# Patient Record
Sex: FEMALE | Race: WHITE | NOT HISPANIC OR LATINO | Employment: OTHER | URBAN - METROPOLITAN AREA
[De-identification: names, ages, dates, MRNs, and addresses within clinical notes are randomized per-mention and may not be internally consistent; named-entity substitution may affect disease eponyms.]

---

## 2017-03-15 PROBLEM — Z86.010 HISTORY OF COLON POLYPS: Status: ACTIVE | Noted: 2017-03-15

## 2017-08-03 PROBLEM — D49.2 NEOPLASM OF UNSPECIFIED BEHAVIOR OF BONE, SOFT TISSUE, AND SKIN: Status: RESOLVED | Noted: 2017-07-20 | Resolved: 2017-08-03

## 2017-10-11 ENCOUNTER — PATIENT OUTREACH (OUTPATIENT)
Dept: HEALTH INFORMATION MANAGEMENT | Facility: OTHER | Age: 67
End: 2017-10-11

## 2017-10-11 NOTE — PROGRESS NOTES
Attempt #:1    Verify PCP: yes    Communication Preference Obtained: yes     Review Care Team: yes    Annual Wellness Visit Scheduling  1. Scheduling Status:Scheduled            Scheduled patient for Annual Wellness Visit       MyChart Activation: declined  [employer] [place]: no  Virtual Visits: no  Opt In to Text Messages: no

## 2018-03-07 PROBLEM — M75.100 ROTATOR CUFF TEAR: Status: ACTIVE | Noted: 2018-03-07

## 2019-10-29 ENCOUNTER — APPOINTMENT (RX ONLY)
Dept: URBAN - METROPOLITAN AREA CLINIC 31 | Facility: CLINIC | Age: 69
Setting detail: DERMATOLOGY
End: 2019-10-29

## 2019-10-29 DIAGNOSIS — D22 MELANOCYTIC NEVI: ICD-10-CM

## 2019-10-29 DIAGNOSIS — Z71.89 OTHER SPECIFIED COUNSELING: ICD-10-CM

## 2019-10-29 DIAGNOSIS — L81.4 OTHER MELANIN HYPERPIGMENTATION: ICD-10-CM

## 2019-10-29 DIAGNOSIS — L63.8 OTHER ALOPECIA AREATA: ICD-10-CM

## 2019-10-29 DIAGNOSIS — D18.0 HEMANGIOMA: ICD-10-CM

## 2019-10-29 PROBLEM — E03.9 HYPOTHYROIDISM, UNSPECIFIED: Status: ACTIVE | Noted: 2019-10-29

## 2019-10-29 PROBLEM — D18.01 HEMANGIOMA OF SKIN AND SUBCUTANEOUS TISSUE: Status: ACTIVE | Noted: 2019-10-29

## 2019-10-29 PROBLEM — D48.5 NEOPLASM OF UNCERTAIN BEHAVIOR OF SKIN: Status: ACTIVE | Noted: 2019-10-29

## 2019-10-29 PROBLEM — D22.5 MELANOCYTIC NEVI OF TRUNK: Status: ACTIVE | Noted: 2019-10-29

## 2019-10-29 PROCEDURE — ? BIOPSY BY SHAVE METHOD

## 2019-10-29 PROCEDURE — 11102 TANGNTL BX SKIN SINGLE LES: CPT

## 2019-10-29 PROCEDURE — 99213 OFFICE O/P EST LOW 20 MIN: CPT | Mod: 25

## 2019-10-29 PROCEDURE — ? COUNSELING

## 2019-10-29 ASSESSMENT — LOCATION ZONE DERM
LOCATION ZONE: TRUNK
LOCATION ZONE: SCALP

## 2019-10-29 ASSESSMENT — SEVERITY ASSESSMENT OVERALL AMONG ALL PATIENTS
IN YOUR EXPERIENCE, AMONG ALL PATIENTS YOU HAVE SEEN WITH THIS CONDITION, HOW SEVERE IS THIS PATIENT'S CONDITION?: S1 (LESS THAN 25% HAIR LOSS)

## 2019-10-29 ASSESSMENT — LOCATION DETAILED DESCRIPTION DERM
LOCATION DETAILED: EPIGASTRIC SKIN
LOCATION DETAILED: LEFT RIB CAGE
LOCATION DETAILED: RIGHT MEDIAL FRONTAL SCALP

## 2019-10-29 ASSESSMENT — LOCATION SIMPLE DESCRIPTION DERM
LOCATION SIMPLE: ABDOMEN
LOCATION SIMPLE: RIGHT SCALP

## 2019-10-29 NOTE — PROCEDURE: BIOPSY BY SHAVE METHOD
Biopsy Type: H and E
Electrodesiccation Text: The wound bed was treated with electrodesiccation after the biopsy was performed.
Additional Anesthesia Volume In Cc (Will Not Render If 0): 0
Anesthesia Type: 1% lidocaine with epinephrine
Bill For Surgical Tray: no
Wound Care: Vaseline
Lab Facility: 
Notification Instructions: Patient will be notified of biopsy results. However, patient instructed to call the office if not contacted within 2 weeks.
Anesthesia Volume In Cc: 0.5
Was A Bandage Applied: Yes
Consent: Written consent was obtained and risks were reviewed including but not limited to scarring, infection, bleeding, scabbing, incomplete removal, nerve damage and allergy to anesthesia.
Dressing: bandage
Electrodesiccation And Curettage Text: The wound bed was treated with electrodesiccation and curettage after the biopsy was performed.
Type Of Destruction Used: Curettage
Hemostasis: Drysol and Electrocautery
Silver Nitrate Text: The wound bed was treated with silver nitrate after the biopsy was performed.
Curettage Text: The wound bed was treated with curettage after the biopsy was performed.
Detail Level: Detailed
Biopsy Method: Personna blade
Depth Of Biopsy: dermis
Cryotherapy Text: The wound bed was treated with cryotherapy after the biopsy was performed.
Billing Type: Third-Party Bill
Post-Care Instructions: I reviewed with the patient in detail post-care instructions. Patient is to keep the biopsy site dry overnight, and then apply vaseline twice daily until healed.
Lab: [id number]

## 2019-10-29 NOTE — HPI: FULL BODY SKIN EXAMINATION
What Is The Reason For Today's Visit?: Full Body Skin Examination
What Is The Reason For Today's Visit? (Being Monitored For X): concerning skin lesions on an annual basis
Additional History: Patient denies any changing moles or tender or bleeding spots

## 2021-05-17 ENCOUNTER — APPOINTMENT (RX ONLY)
Dept: URBAN - METROPOLITAN AREA CLINIC 31 | Facility: CLINIC | Age: 71
Setting detail: DERMATOLOGY
End: 2021-05-17

## 2023-04-03 PROBLEM — K21.9 GASTROESOPHAGEAL REFLUX DISEASE WITHOUT ESOPHAGITIS: Status: ACTIVE | Noted: 2021-04-20

## 2023-04-03 PROBLEM — E78.2 MIXED HYPERLIPIDEMIA: Status: ACTIVE | Noted: 2021-04-20

## 2023-04-06 PROBLEM — R60.0 EXTREMITY EDEMA: Status: ACTIVE | Noted: 2022-04-21

## 2023-04-06 PROBLEM — N95.1 POSTMENOPAUSAL DISORDER: Status: ACTIVE | Noted: 2021-04-20

## 2023-04-06 PROBLEM — R94.31 ABNORMAL ECG: Status: ACTIVE | Noted: 2023-01-17

## 2023-04-06 PROBLEM — I65.22 LEFT CAROTID ARTERY OCCLUSION: Status: ACTIVE | Noted: 2022-03-15

## 2023-04-06 PROBLEM — R22.1 LOCALIZED SWELLING, MASS AND LUMP, NECK: Status: ACTIVE | Noted: 2021-10-11

## 2023-04-06 PROBLEM — E66.9 OBESITY (BMI 35.0-39.9 WITHOUT COMORBIDITY): Status: ACTIVE | Noted: 2021-04-20

## 2023-04-06 PROBLEM — D33.3 SCHWANNOMA OF CRANIAL NERVE (HCC): Status: ACTIVE | Noted: 2021-07-14

## 2023-04-06 PROBLEM — D35.5: Status: ACTIVE | Noted: 2023-04-06

## 2023-04-06 PROBLEM — I65.09 VERTEBRAL ARTERY OBSTRUCTION: Status: ACTIVE | Noted: 2022-03-15

## 2023-04-14 ENCOUNTER — HOSPITAL ENCOUNTER (INPATIENT)
Facility: MEDICAL CENTER | Age: 73
LOS: 6 days | DRG: 853 | End: 2023-04-20
Attending: INTERNAL MEDICINE | Admitting: STUDENT IN AN ORGANIZED HEALTH CARE EDUCATION/TRAINING PROGRAM
Payer: MEDICARE

## 2023-04-14 DIAGNOSIS — R06.09 CHRONIC DYSPNEA: ICD-10-CM

## 2023-04-14 PROBLEM — A41.9 SEVERE SEPSIS (HCC): Status: ACTIVE | Noted: 2023-04-14

## 2023-04-14 PROBLEM — K85.10 GALL STONE PANCREATITIS: Status: ACTIVE | Noted: 2023-04-14

## 2023-04-14 PROBLEM — F10.20 ALCOHOL DEPENDENCE (HCC): Status: ACTIVE | Noted: 2023-04-14

## 2023-04-14 PROBLEM — R73.9 HYPERGLYCEMIA: Status: ACTIVE | Noted: 2023-04-14

## 2023-04-14 PROBLEM — R65.20 SEVERE SEPSIS (HCC): Status: ACTIVE | Noted: 2023-04-14

## 2023-04-14 PROBLEM — Z71.89 ACP (ADVANCE CARE PLANNING): Status: ACTIVE | Noted: 2023-04-14

## 2023-04-14 PROBLEM — E87.6 HYPOKALEMIA: Status: ACTIVE | Noted: 2023-04-14

## 2023-04-14 PROBLEM — K81.9 CHOLECYSTITIS: Status: ACTIVE | Noted: 2023-04-14

## 2023-04-14 PROBLEM — E87.20 LACTIC ACIDOSIS: Status: ACTIVE | Noted: 2023-04-14

## 2023-04-14 PROBLEM — E83.42 HYPOMAGNESEMIA: Status: ACTIVE | Noted: 2023-04-14

## 2023-04-14 PROBLEM — K80.42 CHOLEDOCHOLITHIASIS WITH ACUTE CHOLECYSTITIS: Status: ACTIVE | Noted: 2023-04-14

## 2023-04-14 LAB
CK SERPL-CCNC: 362 U/L (ref 0–154)
CRP SERPL HS-MCNC: 2.55 MG/DL (ref 0–0.75)
ERYTHROCYTE [SEDIMENTATION RATE] IN BLOOD BY WESTERGREN METHOD: 3 MM/HOUR (ref 0–25)
ETHANOL BLD-MCNC: <10.1 MG/DL
LACTATE SERPL-SCNC: 3 MMOL/L (ref 0.5–2)
LDH SERPL L TO P-CCNC: 283 U/L (ref 107–266)
PROCALCITONIN SERPL-MCNC: 22.4 NG/ML
TROPONIN T SERPL-MCNC: 21 NG/L (ref 6–19)

## 2023-04-14 PROCEDURE — 700102 HCHG RX REV CODE 250 W/ 637 OVERRIDE(OP): Performed by: STUDENT IN AN ORGANIZED HEALTH CARE EDUCATION/TRAINING PROGRAM

## 2023-04-14 PROCEDURE — 83605 ASSAY OF LACTIC ACID: CPT | Mod: 91

## 2023-04-14 PROCEDURE — 700105 HCHG RX REV CODE 258: Performed by: STUDENT IN AN ORGANIZED HEALTH CARE EDUCATION/TRAINING PROGRAM

## 2023-04-14 PROCEDURE — 99291 CRITICAL CARE FIRST HOUR: CPT | Performed by: STUDENT IN AN ORGANIZED HEALTH CARE EDUCATION/TRAINING PROGRAM

## 2023-04-14 PROCEDURE — 700101 HCHG RX REV CODE 250: Performed by: STUDENT IN AN ORGANIZED HEALTH CARE EDUCATION/TRAINING PROGRAM

## 2023-04-14 PROCEDURE — A9270 NON-COVERED ITEM OR SERVICE: HCPCS | Performed by: STUDENT IN AN ORGANIZED HEALTH CARE EDUCATION/TRAINING PROGRAM

## 2023-04-14 PROCEDURE — 85652 RBC SED RATE AUTOMATED: CPT

## 2023-04-14 PROCEDURE — 82550 ASSAY OF CK (CPK): CPT

## 2023-04-14 PROCEDURE — 86140 C-REACTIVE PROTEIN: CPT

## 2023-04-14 PROCEDURE — C9113 INJ PANTOPRAZOLE SODIUM, VIA: HCPCS | Performed by: STUDENT IN AN ORGANIZED HEALTH CARE EDUCATION/TRAINING PROGRAM

## 2023-04-14 PROCEDURE — 93005 ELECTROCARDIOGRAM TRACING: CPT | Performed by: STUDENT IN AN ORGANIZED HEALTH CARE EDUCATION/TRAINING PROGRAM

## 2023-04-14 PROCEDURE — 82077 ASSAY SPEC XCP UR&BREATH IA: CPT

## 2023-04-14 PROCEDURE — 770000 HCHG ROOM/CARE - INTERMEDIATE ICU *

## 2023-04-14 PROCEDURE — 700111 HCHG RX REV CODE 636 W/ 250 OVERRIDE (IP): Performed by: STUDENT IN AN ORGANIZED HEALTH CARE EDUCATION/TRAINING PROGRAM

## 2023-04-14 PROCEDURE — 83615 LACTATE (LD) (LDH) ENZYME: CPT

## 2023-04-14 PROCEDURE — 84484 ASSAY OF TROPONIN QUANT: CPT | Mod: 91

## 2023-04-14 PROCEDURE — HZ2ZZZZ DETOXIFICATION SERVICES FOR SUBSTANCE ABUSE TREATMENT: ICD-10-PCS | Performed by: STUDENT IN AN ORGANIZED HEALTH CARE EDUCATION/TRAINING PROGRAM

## 2023-04-14 PROCEDURE — 84145 PROCALCITONIN (PCT): CPT

## 2023-04-14 RX ORDER — LABETALOL HYDROCHLORIDE 5 MG/ML
10 INJECTION, SOLUTION INTRAVENOUS EVERY 4 HOURS PRN
Status: DISCONTINUED | OUTPATIENT
Start: 2023-04-14 | End: 2023-04-20 | Stop reason: HOSPADM

## 2023-04-14 RX ORDER — AMOXICILLIN 250 MG
2 CAPSULE ORAL 2 TIMES DAILY
Status: DISCONTINUED | OUTPATIENT
Start: 2023-04-14 | End: 2023-04-15

## 2023-04-14 RX ORDER — POLYETHYLENE GLYCOL 3350 17 G/17G
1 POWDER, FOR SOLUTION ORAL
Status: DISCONTINUED | OUTPATIENT
Start: 2023-04-14 | End: 2023-04-15

## 2023-04-14 RX ORDER — LORAZEPAM 2 MG/ML
1 INJECTION INTRAMUSCULAR
Status: DISCONTINUED | OUTPATIENT
Start: 2023-04-14 | End: 2023-04-16

## 2023-04-14 RX ORDER — DIAZEPAM 5 MG/1
2.5 TABLET ORAL EVERY 6 HOURS PRN
Status: DISCONTINUED | OUTPATIENT
Start: 2023-04-14 | End: 2023-04-15

## 2023-04-14 RX ORDER — ATORVASTATIN CALCIUM 20 MG/1
20 TABLET, FILM COATED ORAL EVERY EVENING
Status: DISCONTINUED | OUTPATIENT
Start: 2023-04-14 | End: 2023-04-16

## 2023-04-14 RX ORDER — GAUZE BANDAGE 2" X 2"
100 BANDAGE TOPICAL DAILY
Status: COMPLETED | OUTPATIENT
Start: 2023-04-16 | End: 2023-04-19

## 2023-04-14 RX ORDER — FOLIC ACID 1 MG/1
1 TABLET ORAL DAILY
Status: COMPLETED | OUTPATIENT
Start: 2023-04-15 | End: 2023-04-18

## 2023-04-14 RX ORDER — LORAZEPAM 2 MG/ML
2 INJECTION INTRAMUSCULAR
Status: DISCONTINUED | OUTPATIENT
Start: 2023-04-14 | End: 2023-04-16

## 2023-04-14 RX ORDER — LORAZEPAM 2 MG/ML
0.5 INJECTION INTRAMUSCULAR EVERY 4 HOURS PRN
Status: DISCONTINUED | OUTPATIENT
Start: 2023-04-14 | End: 2023-04-16

## 2023-04-14 RX ORDER — OXYCODONE HYDROCHLORIDE 5 MG/1
5 TABLET ORAL
Status: DISCONTINUED | OUTPATIENT
Start: 2023-04-14 | End: 2023-04-20 | Stop reason: HOSPADM

## 2023-04-14 RX ORDER — LORAZEPAM 2 MG/ML
1.5 INJECTION INTRAMUSCULAR
Status: DISCONTINUED | OUTPATIENT
Start: 2023-04-14 | End: 2023-04-16

## 2023-04-14 RX ORDER — ONDANSETRON 2 MG/ML
4 INJECTION INTRAMUSCULAR; INTRAVENOUS EVERY 4 HOURS PRN
Status: DISCONTINUED | OUTPATIENT
Start: 2023-04-14 | End: 2023-04-20 | Stop reason: HOSPADM

## 2023-04-14 RX ORDER — LORAZEPAM 1 MG/1
2 TABLET ORAL
Status: DISCONTINUED | OUTPATIENT
Start: 2023-04-14 | End: 2023-04-16

## 2023-04-14 RX ORDER — GUAIFENESIN/DEXTROMETHORPHAN 100-10MG/5
10 SYRUP ORAL EVERY 6 HOURS PRN
Status: DISCONTINUED | OUTPATIENT
Start: 2023-04-14 | End: 2023-04-20 | Stop reason: HOSPADM

## 2023-04-14 RX ORDER — ONDANSETRON 4 MG/1
4 TABLET, ORALLY DISINTEGRATING ORAL EVERY 4 HOURS PRN
Status: DISCONTINUED | OUTPATIENT
Start: 2023-04-14 | End: 2023-04-20 | Stop reason: HOSPADM

## 2023-04-14 RX ORDER — HYDROMORPHONE HYDROCHLORIDE 1 MG/ML
0.5 INJECTION, SOLUTION INTRAMUSCULAR; INTRAVENOUS; SUBCUTANEOUS
Status: DISCONTINUED | OUTPATIENT
Start: 2023-04-14 | End: 2023-04-20 | Stop reason: HOSPADM

## 2023-04-14 RX ORDER — LEVOTHYROXINE SODIUM 112 UG/1
112 TABLET ORAL
Status: DISCONTINUED | OUTPATIENT
Start: 2023-04-15 | End: 2023-04-20 | Stop reason: HOSPADM

## 2023-04-14 RX ORDER — LORAZEPAM 1 MG/1
1 TABLET ORAL EVERY 4 HOURS PRN
Status: DISCONTINUED | OUTPATIENT
Start: 2023-04-14 | End: 2023-04-16

## 2023-04-14 RX ORDER — LORAZEPAM 1 MG/1
0.5 TABLET ORAL EVERY 4 HOURS PRN
Status: DISCONTINUED | OUTPATIENT
Start: 2023-04-14 | End: 2023-04-16

## 2023-04-14 RX ORDER — LORAZEPAM 1 MG/1
4 TABLET ORAL
Status: DISCONTINUED | OUTPATIENT
Start: 2023-04-14 | End: 2023-04-16

## 2023-04-14 RX ORDER — SODIUM CHLORIDE AND POTASSIUM CHLORIDE 300; 900 MG/100ML; MG/100ML
3000 INJECTION, SOLUTION INTRAVENOUS CONTINUOUS
Status: DISCONTINUED | OUTPATIENT
Start: 2023-04-14 | End: 2023-04-14

## 2023-04-14 RX ORDER — OXYCODONE HYDROCHLORIDE 10 MG/1
10 TABLET ORAL
Status: DISCONTINUED | OUTPATIENT
Start: 2023-04-14 | End: 2023-04-20 | Stop reason: HOSPADM

## 2023-04-14 RX ORDER — LORAZEPAM 1 MG/1
3 TABLET ORAL
Status: DISCONTINUED | OUTPATIENT
Start: 2023-04-14 | End: 2023-04-16

## 2023-04-14 RX ORDER — PANTOPRAZOLE SODIUM 40 MG/10ML
40 INJECTION, POWDER, LYOPHILIZED, FOR SOLUTION INTRAVENOUS 2 TIMES DAILY
Status: COMPLETED | OUTPATIENT
Start: 2023-04-14 | End: 2023-04-17

## 2023-04-14 RX ORDER — ACETAMINOPHEN 325 MG/1
650 TABLET ORAL EVERY 6 HOURS PRN
Status: DISCONTINUED | OUTPATIENT
Start: 2023-04-14 | End: 2023-04-20 | Stop reason: HOSPADM

## 2023-04-14 RX ORDER — SODIUM CHLORIDE, SODIUM LACTATE, POTASSIUM CHLORIDE, AND CALCIUM CHLORIDE .6; .31; .03; .02 G/100ML; G/100ML; G/100ML; G/100ML
500 INJECTION, SOLUTION INTRAVENOUS
Status: DISCONTINUED | OUTPATIENT
Start: 2023-04-14 | End: 2023-04-20 | Stop reason: HOSPADM

## 2023-04-14 RX ORDER — SODIUM CHLORIDE AND POTASSIUM CHLORIDE 300; 900 MG/100ML; MG/100ML
3000 INJECTION, SOLUTION INTRAVENOUS CONTINUOUS
Status: DISCONTINUED | OUTPATIENT
Start: 2023-04-14 | End: 2023-04-15

## 2023-04-14 RX ORDER — LOSARTAN POTASSIUM 50 MG/1
50 TABLET ORAL 2 TIMES DAILY
Status: DISCONTINUED | OUTPATIENT
Start: 2023-04-14 | End: 2023-04-20 | Stop reason: HOSPADM

## 2023-04-14 RX ORDER — BISACODYL 10 MG
10 SUPPOSITORY, RECTAL RECTAL
Status: DISCONTINUED | OUTPATIENT
Start: 2023-04-14 | End: 2023-04-15

## 2023-04-14 RX ADMIN — LOSARTAN POTASSIUM 50 MG: 50 TABLET, FILM COATED ORAL at 22:36

## 2023-04-14 RX ADMIN — MAGNESIUM SULFATE 3 G: 500 INJECTION, SOLUTION INTRAMUSCULAR; INTRAVENOUS at 21:44

## 2023-04-14 RX ADMIN — PIPERACILLIN AND TAZOBACTAM 3.38 G: 3; .375 INJECTION, POWDER, LYOPHILIZED, FOR SOLUTION INTRAVENOUS; PARENTERAL at 20:17

## 2023-04-14 RX ADMIN — POTASSIUM CHLORIDE AND SODIUM CHLORIDE 1000 ML: 900; 300 INJECTION, SOLUTION INTRAVENOUS at 22:33

## 2023-04-14 RX ADMIN — PIPERACILLIN AND TAZOBACTAM 3.38 G: 3; .375 INJECTION, POWDER, LYOPHILIZED, FOR SOLUTION INTRAVENOUS; PARENTERAL at 23:46

## 2023-04-14 RX ADMIN — PANTOPRAZOLE SODIUM 40 MG: 40 INJECTION, POWDER, FOR SOLUTION INTRAVENOUS at 21:40

## 2023-04-14 RX ADMIN — THIAMINE HYDROCHLORIDE 500 MG: 100 INJECTION, SOLUTION INTRAMUSCULAR; INTRAVENOUS at 23:02

## 2023-04-14 RX ADMIN — ATORVASTATIN CALCIUM 20 MG: 20 TABLET, FILM COATED ORAL at 22:36

## 2023-04-14 ASSESSMENT — COGNITIVE AND FUNCTIONAL STATUS - GENERAL
TOILETING: A LITTLE
DAILY ACTIVITIY SCORE: 20
CLIMB 3 TO 5 STEPS WITH RAILING: A LOT
STANDING UP FROM CHAIR USING ARMS: A LOT
WALKING IN HOSPITAL ROOM: A LOT
MOBILITY SCORE: 15
DRESSING REGULAR LOWER BODY CLOTHING: A LITTLE
HELP NEEDED FOR BATHING: A LITTLE
MOVING FROM LYING ON BACK TO SITTING ON SIDE OF FLAT BED: A LITTLE
SUGGESTED CMS G CODE MODIFIER DAILY ACTIVITY: CJ
MOVING TO AND FROM BED TO CHAIR: A LITTLE
DRESSING REGULAR UPPER BODY CLOTHING: A LITTLE
SUGGESTED CMS G CODE MODIFIER MOBILITY: CK
TURNING FROM BACK TO SIDE WHILE IN FLAT BAD: A LITTLE

## 2023-04-14 ASSESSMENT — LIFESTYLE VARIABLES
ORIENTATION AND CLOUDING OF SENSORIUM: ORIENTED AND CAN DO SERIAL ADDITIONS
HOW MANY TIMES IN THE PAST YEAR HAVE YOU HAD 5 OR MORE DRINKS IN A DAY: 0
PAROXYSMAL SWEATS: NO SWEAT VISIBLE
EVER FELT BAD OR GUILTY ABOUT YOUR DRINKING: NO
HAVE YOU EVER FELT YOU SHOULD CUT DOWN ON YOUR DRINKING: NO
ANXIETY: MILDLY ANXIOUS
ALCOHOL_USE: NO
AUDITORY DISTURBANCES: NOT PRESENT
CONSUMPTION TOTAL: NEGATIVE
VISUAL DISTURBANCES: NOT PRESENT
TOTAL SCORE: 0
HAVE PEOPLE ANNOYED YOU BY CRITICIZING YOUR DRINKING: NO
DOES PATIENT WANT TO STOP DRINKING: NO
ON A TYPICAL DAY WHEN YOU DRINK ALCOHOL HOW MANY DRINKS DO YOU HAVE: 1
TOTAL SCORE: 1
HEADACHE, FULLNESS IN HEAD: NOT PRESENT
EVER HAD A DRINK FIRST THING IN THE MORNING TO STEADY YOUR NERVES TO GET RID OF A HANGOVER: NO
TOTAL SCORE: 0
AVERAGE NUMBER OF DAYS PER WEEK YOU HAVE A DRINK CONTAINING ALCOHOL: 0
TREMOR: NO TREMOR
NAUSEA AND VOMITING: NO NAUSEA AND NO VOMITING
AGITATION: NORMAL ACTIVITY
TOTAL SCORE: 0

## 2023-04-14 ASSESSMENT — PATIENT HEALTH QUESTIONNAIRE - PHQ9
1. LITTLE INTEREST OR PLEASURE IN DOING THINGS: NOT AT ALL
SUM OF ALL RESPONSES TO PHQ9 QUESTIONS 1 AND 2: 0
2. FEELING DOWN, DEPRESSED, IRRITABLE, OR HOPELESS: NOT AT ALL

## 2023-04-14 ASSESSMENT — FIBROSIS 4 INDEX
FIB4 SCORE: 4.34

## 2023-04-14 NOTE — PROGRESS NOTES
72 year old with gallstone pancreatitis; lactic acid 5 but hemodynamically stable with normal mental status. She is ok for monitoring in the IMCU. Hospitalist to manage, critical care available upon request.    Evan Dsouza M.D.

## 2023-04-15 ENCOUNTER — APPOINTMENT (OUTPATIENT)
Dept: CARDIOLOGY | Facility: MEDICAL CENTER | Age: 73
DRG: 853 | End: 2023-04-15
Attending: STUDENT IN AN ORGANIZED HEALTH CARE EDUCATION/TRAINING PROGRAM
Payer: MEDICARE

## 2023-04-15 LAB
ALBUMIN SERPL BCP-MCNC: 3.6 G/DL (ref 3.2–4.9)
ALBUMIN/GLOB SERPL: 1.7 G/DL
ALP SERPL-CCNC: 61 U/L (ref 30–99)
ALT SERPL-CCNC: 69 U/L (ref 2–50)
ANION GAP SERPL CALC-SCNC: 10 MMOL/L (ref 7–16)
AST SERPL-CCNC: 93 U/L (ref 12–45)
BASOPHILS # BLD AUTO: 0.3 % (ref 0–1.8)
BASOPHILS # BLD: 0.04 K/UL (ref 0–0.12)
BILIRUB CONJ SERPL-MCNC: 0.3 MG/DL (ref 0.1–0.5)
BILIRUB INDIRECT SERPL-MCNC: 0.4 MG/DL (ref 0–1)
BILIRUB SERPL-MCNC: 0.9 MG/DL (ref 0.1–1.5)
BUN SERPL-MCNC: 11 MG/DL (ref 8–22)
C DIFF DNA SPEC QL NAA+PROBE: NEGATIVE
C DIFF TOX GENS STL QL NAA+PROBE: NEGATIVE
CALCIUM ALBUM COR SERPL-MCNC: 7.7 MG/DL (ref 8.5–10.5)
CALCIUM SERPL-MCNC: 7.4 MG/DL (ref 8.5–10.5)
CHLORIDE SERPL-SCNC: 106 MMOL/L (ref 96–112)
CO2 SERPL-SCNC: 21 MMOL/L (ref 20–33)
CREAT SERPL-MCNC: 1.01 MG/DL (ref 0.5–1.4)
EKG IMPRESSION: NORMAL
EKG IMPRESSION: NORMAL
EOSINOPHIL # BLD AUTO: 0.03 K/UL (ref 0–0.51)
EOSINOPHIL NFR BLD: 0.2 % (ref 0–6.9)
ERYTHROCYTE [DISTWIDTH] IN BLOOD BY AUTOMATED COUNT: 42.5 FL (ref 35.9–50)
EST. AVERAGE GLUCOSE BLD GHB EST-MCNC: 123 MG/DL
GFR SERPLBLD CREATININE-BSD FMLA CKD-EPI: 58 ML/MIN/1.73 M 2
GLOBULIN SER CALC-MCNC: 2.1 G/DL (ref 1.9–3.5)
GLUCOSE BLD STRIP.AUTO-MCNC: 117 MG/DL (ref 65–99)
GLUCOSE BLD STRIP.AUTO-MCNC: 79 MG/DL (ref 65–99)
GLUCOSE BLD STRIP.AUTO-MCNC: 88 MG/DL (ref 65–99)
GLUCOSE SERPL-MCNC: 130 MG/DL (ref 65–99)
HBA1C MFR BLD: 5.9 % (ref 4–5.6)
HCT VFR BLD AUTO: 38.6 % (ref 37–47)
HGB BLD-MCNC: 12.8 G/DL (ref 12–16)
IMM GRANULOCYTES # BLD AUTO: 0.12 K/UL (ref 0–0.11)
IMM GRANULOCYTES NFR BLD AUTO: 0.8 % (ref 0–0.9)
LACTATE SERPL-SCNC: 1.7 MMOL/L (ref 0.5–2)
LACTATE SERPL-SCNC: 2.1 MMOL/L (ref 0.5–2)
LACTATE SERPL-SCNC: 2.9 MMOL/L (ref 0.5–2)
LIPASE SERPL-CCNC: 652 U/L (ref 11–82)
LYMPHOCYTES # BLD AUTO: 0.61 K/UL (ref 1–4.8)
LYMPHOCYTES NFR BLD: 4 % (ref 22–41)
MAGNESIUM SERPL-MCNC: 2.4 MG/DL (ref 1.5–2.5)
MCH RBC QN AUTO: 29.4 PG (ref 27–33)
MCHC RBC AUTO-ENTMCNC: 33.2 G/DL (ref 33.6–35)
MCV RBC AUTO: 88.5 FL (ref 81.4–97.8)
MONOCYTES # BLD AUTO: 0.82 K/UL (ref 0–0.85)
MONOCYTES NFR BLD AUTO: 5.4 % (ref 0–13.4)
NEUTROPHILS # BLD AUTO: 13.49 K/UL (ref 2–7.15)
NEUTROPHILS NFR BLD: 89.3 % (ref 44–72)
NRBC # BLD AUTO: 0 K/UL
NRBC BLD-RTO: 0 /100 WBC
PHOSPHATE SERPL-MCNC: 2.6 MG/DL (ref 2.5–4.5)
PLATELET # BLD AUTO: 149 K/UL (ref 164–446)
PMV BLD AUTO: 10.1 FL (ref 9–12.9)
POTASSIUM SERPL-SCNC: 5.1 MMOL/L (ref 3.6–5.5)
PROT SERPL-MCNC: 5.7 G/DL (ref 6–8.2)
RBC # BLD AUTO: 4.36 M/UL (ref 4.2–5.4)
SODIUM SERPL-SCNC: 137 MMOL/L (ref 135–145)
TROPONIN T SERPL-MCNC: 15 NG/L (ref 6–19)
WBC # BLD AUTO: 15.1 K/UL (ref 4.8–10.8)

## 2023-04-15 PROCEDURE — 83605 ASSAY OF LACTIC ACID: CPT | Mod: 91

## 2023-04-15 PROCEDURE — 99233 SBSQ HOSP IP/OBS HIGH 50: CPT | Performed by: INTERNAL MEDICINE

## 2023-04-15 PROCEDURE — 84484 ASSAY OF TROPONIN QUANT: CPT

## 2023-04-15 PROCEDURE — 700102 HCHG RX REV CODE 250 W/ 637 OVERRIDE(OP): Performed by: STUDENT IN AN ORGANIZED HEALTH CARE EDUCATION/TRAINING PROGRAM

## 2023-04-15 PROCEDURE — 770000 HCHG ROOM/CARE - INTERMEDIATE ICU *

## 2023-04-15 PROCEDURE — 87493 C DIFF AMPLIFIED PROBE: CPT

## 2023-04-15 PROCEDURE — 85025 COMPLETE CBC W/AUTO DIFF WBC: CPT

## 2023-04-15 PROCEDURE — A9270 NON-COVERED ITEM OR SERVICE: HCPCS | Performed by: INTERNAL MEDICINE

## 2023-04-15 PROCEDURE — 83690 ASSAY OF LIPASE: CPT

## 2023-04-15 PROCEDURE — 93005 ELECTROCARDIOGRAM TRACING: CPT | Performed by: STUDENT IN AN ORGANIZED HEALTH CARE EDUCATION/TRAINING PROGRAM

## 2023-04-15 PROCEDURE — C9113 INJ PANTOPRAZOLE SODIUM, VIA: HCPCS | Performed by: STUDENT IN AN ORGANIZED HEALTH CARE EDUCATION/TRAINING PROGRAM

## 2023-04-15 PROCEDURE — 700101 HCHG RX REV CODE 250: Performed by: STUDENT IN AN ORGANIZED HEALTH CARE EDUCATION/TRAINING PROGRAM

## 2023-04-15 PROCEDURE — 82962 GLUCOSE BLOOD TEST: CPT | Mod: 91

## 2023-04-15 PROCEDURE — 700111 HCHG RX REV CODE 636 W/ 250 OVERRIDE (IP): Performed by: INTERNAL MEDICINE

## 2023-04-15 PROCEDURE — 700105 HCHG RX REV CODE 258: Performed by: STUDENT IN AN ORGANIZED HEALTH CARE EDUCATION/TRAINING PROGRAM

## 2023-04-15 PROCEDURE — 93010 ELECTROCARDIOGRAM REPORT: CPT | Performed by: INTERNAL MEDICINE

## 2023-04-15 PROCEDURE — 82248 BILIRUBIN DIRECT: CPT

## 2023-04-15 PROCEDURE — 99232 SBSQ HOSP IP/OBS MODERATE 35: CPT | Performed by: SURGERY

## 2023-04-15 PROCEDURE — 700102 HCHG RX REV CODE 250 W/ 637 OVERRIDE(OP): Performed by: INTERNAL MEDICINE

## 2023-04-15 PROCEDURE — 83735 ASSAY OF MAGNESIUM: CPT

## 2023-04-15 PROCEDURE — 700102 HCHG RX REV CODE 250 W/ 637 OVERRIDE(OP): Performed by: HOSPITALIST

## 2023-04-15 PROCEDURE — 84100 ASSAY OF PHOSPHORUS: CPT

## 2023-04-15 PROCEDURE — 700101 HCHG RX REV CODE 250: Performed by: INTERNAL MEDICINE

## 2023-04-15 PROCEDURE — 80053 COMPREHEN METABOLIC PANEL: CPT

## 2023-04-15 PROCEDURE — 83036 HEMOGLOBIN GLYCOSYLATED A1C: CPT

## 2023-04-15 PROCEDURE — A9270 NON-COVERED ITEM OR SERVICE: HCPCS | Performed by: HOSPITALIST

## 2023-04-15 PROCEDURE — A9270 NON-COVERED ITEM OR SERVICE: HCPCS | Performed by: STUDENT IN AN ORGANIZED HEALTH CARE EDUCATION/TRAINING PROGRAM

## 2023-04-15 PROCEDURE — 700111 HCHG RX REV CODE 636 W/ 250 OVERRIDE (IP): Performed by: STUDENT IN AN ORGANIZED HEALTH CARE EDUCATION/TRAINING PROGRAM

## 2023-04-15 RX ORDER — METRONIDAZOLE 500 MG/1
500 TABLET ORAL EVERY 8 HOURS
Status: DISCONTINUED | OUTPATIENT
Start: 2023-04-15 | End: 2023-04-20 | Stop reason: HOSPADM

## 2023-04-15 RX ORDER — ATORVASTATIN CALCIUM 10 MG/1
10 TABLET, FILM COATED ORAL NIGHTLY
COMMUNITY
End: 2023-10-16 | Stop reason: SDUPTHER

## 2023-04-15 RX ORDER — EZETIMIBE 10 MG/1
10 TABLET ORAL DAILY
COMMUNITY
End: 2023-05-15

## 2023-04-15 RX ORDER — ENOXAPARIN SODIUM 100 MG/ML
40 INJECTION SUBCUTANEOUS DAILY
Status: DISCONTINUED | OUTPATIENT
Start: 2023-04-15 | End: 2023-04-20 | Stop reason: HOSPADM

## 2023-04-15 RX ORDER — DIAZEPAM 5 MG/1
1.25 TABLET ORAL ONCE
Status: COMPLETED | OUTPATIENT
Start: 2023-04-15 | End: 2023-04-15

## 2023-04-15 RX ADMIN — LOSARTAN POTASSIUM 50 MG: 50 TABLET, FILM COATED ORAL at 06:23

## 2023-04-15 RX ADMIN — PANTOPRAZOLE SODIUM 40 MG: 40 INJECTION, POWDER, FOR SOLUTION INTRAVENOUS at 06:25

## 2023-04-15 RX ADMIN — THIAMINE HYDROCHLORIDE 500 MG: 100 INJECTION, SOLUTION INTRAMUSCULAR; INTRAVENOUS at 15:28

## 2023-04-15 RX ADMIN — CEFTRIAXONE SODIUM 1000 MG: 10 INJECTION, POWDER, FOR SOLUTION INTRAVENOUS at 15:24

## 2023-04-15 RX ADMIN — METRONIDAZOLE 500 MG: 500 TABLET ORAL at 21:39

## 2023-04-15 RX ADMIN — DIAZEPAM 1.25 MG: 5 TABLET ORAL at 06:22

## 2023-04-15 RX ADMIN — LOSARTAN POTASSIUM 50 MG: 50 TABLET, FILM COATED ORAL at 18:00

## 2023-04-15 RX ADMIN — LORAZEPAM 0.5 MG: 0.5 TABLET ORAL at 21:38

## 2023-04-15 RX ADMIN — THIAMINE HYDROCHLORIDE 500 MG: 100 INJECTION, SOLUTION INTRAMUSCULAR; INTRAVENOUS at 09:41

## 2023-04-15 RX ADMIN — POTASSIUM CHLORIDE AND SODIUM CHLORIDE 1000 ML: 900; 300 INJECTION, SOLUTION INTRAVENOUS at 05:02

## 2023-04-15 RX ADMIN — METRONIDAZOLE 500 MG: 500 TABLET ORAL at 15:24

## 2023-04-15 RX ADMIN — DIAZEPAM 1.25 MG: 5 TABLET ORAL at 03:27

## 2023-04-15 RX ADMIN — POTASSIUM CHLORIDE AND SODIUM CHLORIDE 1000 ML: 900; 300 INJECTION, SOLUTION INTRAVENOUS at 11:29

## 2023-04-15 RX ADMIN — ENOXAPARIN SODIUM 40 MG: 40 INJECTION SUBCUTANEOUS at 18:14

## 2023-04-15 RX ADMIN — ATORVASTATIN CALCIUM 20 MG: 20 TABLET, FILM COATED ORAL at 18:14

## 2023-04-15 RX ADMIN — LEVOTHYROXINE SODIUM 112 MCG: 0.11 TABLET ORAL at 06:24

## 2023-04-15 RX ADMIN — PIPERACILLIN AND TAZOBACTAM 3.38 G: 3; .375 INJECTION, POWDER, LYOPHILIZED, FOR SOLUTION INTRAVENOUS; PARENTERAL at 07:55

## 2023-04-15 RX ADMIN — PANTOPRAZOLE SODIUM 40 MG: 40 INJECTION, POWDER, FOR SOLUTION INTRAVENOUS at 18:14

## 2023-04-15 RX ADMIN — FOLIC ACID 1 MG: 1 TABLET ORAL at 06:23

## 2023-04-15 RX ADMIN — THERA TABS 1 TABLET: TAB at 06:25

## 2023-04-15 ASSESSMENT — LIFESTYLE VARIABLES
PAROXYSMAL SWEATS: NO SWEAT VISIBLE
ANXIETY: MILDLY ANXIOUS
PAROXYSMAL SWEATS: BARELY PERCEPTIBLE SWEATING. PALMS MOIST
VISUAL DISTURBANCES: NOT PRESENT
PAROXYSMAL SWEATS: NO SWEAT VISIBLE
TOTAL SCORE: 1
ANXIETY: *
HEADACHE, FULLNESS IN HEAD: NOT PRESENT
NAUSEA AND VOMITING: NO NAUSEA AND NO VOMITING
VISUAL DISTURBANCES: NOT PRESENT
NAUSEA AND VOMITING: NO NAUSEA AND NO VOMITING
ORIENTATION AND CLOUDING OF SENSORIUM: ORIENTED AND CAN DO SERIAL ADDITIONS
SUBSTANCE_ABUSE: 0
VISUAL DISTURBANCES: NOT PRESENT
AGITATION: NORMAL ACTIVITY
AUDITORY DISTURBANCES: NOT PRESENT
AGITATION: NORMAL ACTIVITY
TREMOR: NO TREMOR
AUDITORY DISTURBANCES: NOT PRESENT
TREMOR: NO TREMOR
TREMOR: TREMOR NOT VISIBLE BUT CAN BE FELT, FINGERTIP TO FINGERTIP
AUDITORY DISTURBANCES: NOT PRESENT
AGITATION: NORMAL ACTIVITY
ORIENTATION AND CLOUDING OF SENSORIUM: ORIENTED AND CAN DO SERIAL ADDITIONS
NAUSEA AND VOMITING: NO NAUSEA AND NO VOMITING
ANXIETY: *
HEADACHE, FULLNESS IN HEAD: NOT PRESENT
TOTAL SCORE: 2
TOTAL SCORE: 5
ORIENTATION AND CLOUDING OF SENSORIUM: ORIENTED AND CAN DO SERIAL ADDITIONS
HEADACHE, FULLNESS IN HEAD: NOT PRESENT

## 2023-04-15 ASSESSMENT — ENCOUNTER SYMPTOMS
SPUTUM PRODUCTION: 0
HEADACHES: 0
COUGH: 0
FEVER: 0
NECK PAIN: 0
DIZZINESS: 0
ABDOMINAL PAIN: 1
CHILLS: 0
SENSORY CHANGE: 0
BLURRED VISION: 0
ORTHOPNEA: 0
SHORTNESS OF BREATH: 0
PHOTOPHOBIA: 0
EYE PAIN: 0
TREMORS: 0
BACK PAIN: 0
NAUSEA: 1
DOUBLE VISION: 0
VOMITING: 0
HALLUCINATIONS: 0
WEIGHT LOSS: 0
FOCAL WEAKNESS: 0
SPEECH CHANGE: 0
MYALGIAS: 0
PALPITATIONS: 0
CONSTIPATION: 0
DIARRHEA: 0
TINGLING: 0

## 2023-04-15 ASSESSMENT — FIBROSIS 4 INDEX: FIB4 SCORE: 5.56

## 2023-04-15 ASSESSMENT — PAIN DESCRIPTION - PAIN TYPE: TYPE: ACUTE PAIN

## 2023-04-15 NOTE — H&P
Hospital Medicine History & Physical Note    Date of Service  4/14/2023    Primary Care Physician  Jairo Landry D.O.    Consultants  Surgery (Dr. Severino)  ICU - to [facility]    Code Status  Full Code    Chief Complaint  No chief complaint on file.  Abdominal pain    History of Presenting Illness  Audrey Delgadillo is a 72 y.o. morbidly obese female with history of alcohol dependence, hypertension, hyperlipidemia, hypothyroidism, GERD who presented 4/14/2023 to [facility] as [place] [facility] for suspected acute gallstone pancreatitis.  Patient reported having sudden onset abdominal pain as she was drinking coffee in the morning to take her thyroid medication.  She stated severe abdominal discomfort, resulting in syncopal episode.  She was taken [place] [facility] [place].  At [facility], noted to have elevated lipase level, lactic acid of 5.2.  CTAP suggestive of cholecystitis, pancreatitis and stone in pancreatic duct with dilatation of proximal pancreatic duct.  Ultimately, patient was transferred to [facility] for higher level care.  Accepting  ICU attending recommended [facility] admission.  Patient was seen by me at [facility], admitted to medicine service.  IVF, antibiotic continued, MRCP order.  I consulted on-call surgery.    I discussed the plan of care with patient, bedside RN, and critical care and general surgery.    Review of Systems  ROS    Past Medical History   has a past medical history of Anxiety (2/5/2013), Arthritis, Cancer (HCC), Dizziness (2/5/2013), Encounter for screening colonoscopy (12/12/2018), High cholesterol, Hyperlipidemia, Hypertension, Insomnia (2/5/2013), MRSA nasal colonization, OSTEOPOROSIS, and Unspecified essential hypertension (2/5/2013).    Surgical History   has a past surgical history that includes transsphenoidal resection (9/29/08); lumpectomy (1993); abdominal hysterectomy total; shoulder arthroscopy w/ rotator cuff repair (Left, 3/7/2018); and colonoscopy - endo  "(2018).     Family History  family history includes Heart Attack (age of onset: 50) in her father; Heart Disease (age of onset: 20) in her maternal aunt; Heart Disease (age of onset: 95) in her maternal grandmother; Hypertension in her mother.   Family history reviewed with patient. There is family history that is pertinent to the chief complaint.     Social History   reports that she has never smoked. She has been exposed to tobacco smoke. She has never used smokeless tobacco. She reports current alcohol use. She reports that she does not use drugs.    Allergies  Allergies   Allergen Reactions    Morphine Unspecified     hallucinate    Ezetimibe      \"Headache, dizzy\"    Keflex Diarrhea       Medications  Prior to Admission Medications   Prescriptions Last Dose Informant Patient Reported? Taking?   Calcium Carb-Cholecalciferol (CALCIUM 1000 + D) 1000-800 MG-UNIT Tab   Yes No   Sig: Take 1 Tablet by mouth every day.   atorvastatin (LIPITOR) 20 MG Tab   No No   Si po q hs   diazePAM (VALIUM) 5 MG Tab   Yes No   Sig: Take 2.5 mg by mouth every 6 hours as needed for Anxiety.   levothyroxine (SYNTHROID) 112 MCG Tab   Yes No   losartan (COZAAR) 50 MG Tab   No No   Sig: Take 1 Tablet by mouth 2 times a day.   multivitamin (THERAGRAN) per tablet   Yes No   Sig: Take 1 Tab by mouth every day.     nitroglycerin (NITROSTAT) 0.4 MG SL Tab   Yes No   Sig: Place 0.4 mg under the tongue.   potassium chloride (MICRO-K) 10 MEQ capsule   Yes No   Sig: Take 10 mEq by mouth every day.      Facility-Administered Medications: None       Physical Exam  Temp:  [36.4 °C (97.5 °F)-37 °C (98.6 °F)] 37 °C (98.6 °F)  Pulse:  [] 102  Resp:  [15-28] 22  BP: (123-162)/() 156/70  SpO2:  [86 %-99 %] 95 %  Blood Pressure : (!) 141/76       Pulse: (!) 106   Respiration: 15   Pulse Oximetry: 99 %       Physical Exam    Laboratory:  Recent Labs     23  1151   WBC 6.9   RBC 5.23   HEMOGLOBIN 15.3   HEMATOCRIT 45.8   MCV 87.6 "   MCH 29.3   MCHC 33.4   RDW 12.5   PLATELETCT 167   MPV 9.6     Recent Labs     04/14/23  1151   SODIUM 138   POTASSIUM 3.2*   CHLORIDE 100   CO2 24   GLUCOSE 192*   BUN 18   CREATININE 1.0   CALCIUM 8.8     Recent Labs     04/14/23  1151   ALTSGPT 35   ASTSGOT 58*   ALKPHOSPHAT 83   TBILIRUBIN 0.7   LIPASE >250*   GLUCOSE 192*         No results for input(s): NTPROBNP in the last 72 hours.      No results for input(s): TROPONINT in the last 72 hours.    Imaging:  [id number]    (Results Pending)   EC-ECHOCARDIOGRAM COMPLETE W/O CONT    (Results Pending)       X-Ray:  I have personally reviewed the images and compared with prior images.  EKG:  I have personally reviewed the images and compared with prior images.    Assessment/Plan:  Justification for Admission Status  I anticipate this patient will require at least 2 midnights of hospitalization, therefore appropriate for inpatient status.        * Acute gallstone pancreatitis  Assessment & Plan  Suspected  Elevated lipase  Cholecystitis and pancreatitis noted on CTAP  IVF  PPI  Supportive pain control  Surgery follow-up appreciated    Choledocholithiasis with acute cholecystitis  Assessment & Plan  Suspected  [place] [facility] Mild circumferential gallbladder wall thickening and mild peripancreatic inflammatory changes are concerning for cholecystitis and pancreatitis.  Stone in the pancreatic duct with dilatation of more proximal pancreatic duct.    IVF  Antibiotic: Zosyn  Follow-up MRCP --May need GI consult  Surgery follow-up appreciated    Hypomagnesemia  Assessment & Plan  Mg 1.6 -- replace    Hypokalemia  Assessment & Plan  K 3.2 -- replace    Lactic acidosis  Assessment & Plan  Likely secondary to severe sepsis  IVF  Antibiotic  Reviewed by me    Severe sepsis (HCC)  Assessment & Plan  This is Severe Sepsis Present on admission  SIRS criteria identified on my evaluation include: Tachycardia, with heart rate greater than 90 BPM and Tachypnea, with  respirations greater than 20 per minute  Clinical indicators of end organ dysfunction include Lactate >2 mmol/L (18.0 mg/dL)  Source is GI  Sepsis protocol initiated  Crystalloid Fluid Administration: Fluid resuscitation ordered per standard protocol - 30 mL/kg per current or ideal body weight  IV antibiotics as appropriate for source of sepsis  Reassessment: I have reassessed the patient's hemodynamic status    Cholecystitis  Assessment & Plan  Noted on CTAP  IVF  Antibiotic  Surgery follow up appreciated    Gastroesophageal reflux disease without esophagitis- (present on admission)  Assessment & Plan  PPI    Hypothyroidism- (present on admission)  Assessment & Plan  Synthroid    Dyslipidemia- (present on admission)  Assessment & Plan  Statin    Essential hypertension- (present on admission)  Assessment & Plan  Losartan    Alcohol dependence (HCC)  Assessment & Plan  Multivitamins  CIWA    Hyperglycemia  Assessment & Plan  IVF  ISS    ACP (advance care planning)  Assessment & Plan  Discussed with patient in IMCU.  Patient is agreeable for invasive (CPR/defibrillation/intubation or mechanical ventilation) and noninvasive medical management.  Additionally, she is agreeable for IVF, antibiotic, possible ERCP and/or surgical intervention. FULL code status confirmed.  ACP: 17mins    Schwannoma of cranial nerve (HCC)- (present on admission)  Assessment & Plan  Per history   outpatient follow-up    Class 1 obesity in adult  Assessment & Plan  Diet and lifestyle modification  Body mass index is 34.1 kg/m².      History of breast cancer- (present on admission)  Assessment & Plan  History of  Treated with surgery, chemoradiation and subsequent bilateral oophorectomy        VTE prophylaxis: SCDs/TEDs    Critical care: 40 minutes spent in chart review, aggressive medical management, coordinating care with patient/IMCU staff/ancillary staff/pharmacy/consulting providers

## 2023-04-15 NOTE — ASSESSMENT & PLAN NOTE
Suspected  [place] from [facility]: Mild circumferential gallbladder wall thickening and mild peripancreatic inflammatory changes are concerning for cholecystitis and pancreatitis.  Stone in the pancreatic duct with dilatation of more proximal pancreatic duct.  Continue on ceftriaxone and Flagyl  S/o cholecystectomy

## 2023-04-15 NOTE — PROGRESS NOTES
DATE: 4/15/2023    Hospital Day 2  gallstone pancreatitis .    INTERVAL EVENTS:  New medical admission for gallstone pancreatitis.  Resuscitative measures.  MRCP pending.    PHYSICAL EXAMINATION:  Vital Signs: /63   Pulse 93   Temp 35.9 °C (96.7 °F) (Temporal)   Resp 19   Ht 1.524 m (5')   Wt 71.8 kg (158 lb 4.6 oz)   SpO2 96%     Residual right upper quadrant and epigastric tenderness.    LABORATORY VALUES:   Recent Labs     04/14/23  1151 04/15/23  0200   WBC 6.9 15.1*   RBC 5.23 4.36   HEMOGLOBIN 15.3 12.8   HEMATOCRIT 45.8 38.6   MCV 87.6 88.5   MCH 29.3 29.4   MCHC 33.4 33.2*   RDW 12.5 42.5   PLATELETCT 167 149*   MPV 9.6 10.1     Recent Labs     04/14/23  1151 04/15/23  0200 04/15/23  0336   ASTSGOT 58*  --  93*   ALTSGPT 35  --  69*   TBILIRUBIN 0.7  --  0.9   IBILIRUBIN  --  0.4  --    DBILIRUBIN  --  0.3  --    ALKPHOSPHAT 83  --  61   GLOBULIN  --   --  2.1       ASSESSMENT AND PLAN:   Gallstone pancreatitis.  Concur with current management.  No evidence for acute biliary obstruction.  Cholecystectomy on this hospital admission following resolution of acute pancreatitis.       ____________________________________     Cj Rivas M.D.    DD: 4/15/2023  7:02 AM

## 2023-04-15 NOTE — ASSESSMENT & PLAN NOTE
Suspected  Elevated lipase  Cholecystitis and pancreatitis noted on CTAP  S/p cholecystectomy, tolerating diet

## 2023-04-15 NOTE — PROGRESS NOTES
Med Rec complete per patient  No oral antibiotics in the last 30 days per patient  Allergies reviewed  Preferred Pharmacy: [facility] in [facility]: [facility] pharmacy (mail order)    Patient discontinued Zetia due to allergic reaction

## 2023-04-15 NOTE — CARE PLAN
The patient is Stable - Low risk of patient condition declining or worsening    Shift Goals  Clinical Goals: Hemodymamic stability  Patient Goals: rest  Family Goals: álvaro    Progress made toward(s) clinical / shift goals:    Problem: Knowledge Deficit - Standard  Goal: Patient and family/care givers will demonstrate understanding of plan of care, disease process/condition, diagnostic tests and medications  Outcome: Progressing     Problem: Hemodynamics  Goal: Patient's hemodynamics, fluid balance and neurologic status will be stable or improve  Outcome: Progressing     Problem: Fluid Volume  Goal: Fluid volume balance will be maintained  Outcome: Progressing     Problem: Urinary - Renal Perfusion  Goal: Ability to achieve and maintain adequate renal perfusion and functioning will improve  Outcome: Progressing     Problem: Respiratory  Goal: Patient will achieve/maintain optimum respiratory ventilation and gas exchange  Outcome: Progressing     Problem: Fall Risk  Goal: Patient will remain free from falls  Outcome: Progressing     Problem: Optimal Care for Alcohol Withdrawal  Goal: Optimal Care for the alcohol withdrawal patient  Outcome: Progressing     Problem: Seizure Precautions  Goal: Implementation of seizure precautions  Outcome: Progressing     Problem: Risk for Aspiration  Goal: Patient's risk for aspiration will be absent or decrease  Outcome: Progressing       Patient is not progressing towards the following goals:

## 2023-04-15 NOTE — PROGRESS NOTES
4 Eyes Skin Assessment Completed by Scott Griffin, RN and THALIA Hollis.    Head WDL  Ears Redness non blanching  Nose WDL  Mouth WDL  Neck Scar  Breast/Chest WDL  Shoulder Blades WDL  Spine WDL  (R) Arm/Elbow/Hand Bruising  (L) Arm/Elbow/Hand Redness and Blanching  Abdomen WDL  Groin WDL  Scrotum/Coccyx/Buttocks Redness and Blanching  (R) Leg WDL  (L) Leg WDL  (R) Heel/Foot/Toe Redness/Dry heel  (L) Heel/Foot/Toe Redness/Dry heel          Devices In Places ECG, Blood Pressure Cuff, Pulse Ox, and Nasal Cannula      Interventions In Place NC W/Ear Foams, Low Air Loss Mattress, and Heels Loaded W/Pillows    Possible Skin Injury Yes    Pictures Uploaded Into Epic Yes  Wound Consult Placed Yes  RN Wound Prevention Protocol Ordered Yes

## 2023-04-15 NOTE — CONSULTS
"  DATE OF CONSULTATION:  4/14/2023     REFERRING PHYSICIAN:   Alec Feldman M.D.    CONSULTING PHYSICIAN:  Zachery Severino M.D.     REASON FOR CONSULTATION:  I have been asked by Dr.Kevin Gaston M.D. to see the patient in surgical consultation for evaluation of Pancreatitis    HISTORY OF PRESENT ILLNESS: Audrey Delgadillo is a very pleasant 72-year-old female.  She is receiving care in the medicine service evaluation for pancreatitis.  Per report transfer from outside hospital cholecystitis pancreatitis stone in the pancreatic duct elevated lipase.  Medicine service requested MRCP GI consult  Audrey Delgadillo is awake alert and appropriate.  She states 1 day history of mid abdominal pain.  States pain is well controlled now.  She states no headache.  She reports tinnitus balance problems ongoing several months  States no chest pain or difficulty breathing  States no pain extremities  She reports she lives independently able to ambulate without difficulty.  Able to drive           PAST MEDICAL HISTORY:  has a past medical history of Anxiety (2/5/2013), Arthritis, Cancer (HCC), Dizziness (2/5/2013), Encounter for screening colonoscopy (12/12/2018), High cholesterol, Hyperlipidemia, Hypertension, Insomnia (2/5/2013), MRSA nasal colonization, OSTEOPOROSIS, and Unspecified essential hypertension (2/5/2013).    PAST SURGICAL HISTORY:  has a past surgical history that includes transsphenoidal resection (9/29/08); lumpectomy (1993); abdominal hysterectomy total; shoulder arthroscopy w/ rotator cuff repair (Left, 3/7/2018); and colonoscopy - endo (12/12/2018).    ALLERGIES:   Allergies   Allergen Reactions    Morphine Unspecified     hallucinate    Ezetimibe      \"Headache, dizzy\"    Keflex Diarrhea       CURRENT MEDICATIONS:    Home Medications    **Home medications have not yet been reviewed for this encounter**         FAMILY HISTORY: family history includes Heart Attack (age of onset: 50) in her father; Heart Disease " (age of onset: 20) in her maternal aunt; Heart Disease (age of onset: 95) in her maternal grandmother; Hypertension in her mother.    SOCIAL HISTORY:  reports that she has never smoked. She has been exposed to tobacco smoke. She has never used smokeless tobacco. She reports current alcohol use. She reports that she does not use drugs.    REVIEW OF SYSTEMS: Comprehensive review of systems is negative with the exception of the aforementioned HPI, PMH, and PSH bullets in accordance with CMS guidelines.    PHYSICAL EXAMINATION:    Physical Exam  BP (!) 141/76   Pulse (!) 106   Temp 36.4 °C (97.5 °F) (Temporal)   Resp 15   SpO2 99%    Awake alert appropriate  Friendly cooperative  Breathing with ease no cough no distress  Elevated heart rate skin warm brisk cap refill palpable radial pulse  Abdomen mildly distended tender epigastrium right upper quadrant no guarding no rebound    LABORATORY VALUES:   Recent Labs     04/14/23  1151   WBC 6.9   RBC 5.23   HEMOGLOBIN 15.3   HEMATOCRIT 45.8   MCV 87.6   MCH 29.3   MCHC 33.4   RDW 12.5   PLATELETCT 167   MPV 9.6     Recent Labs     04/14/23  1151   SODIUM 138   POTASSIUM 3.2*   CHLORIDE 100   CO2 24   GLUCOSE 192*   BUN 18   CREATININE 1.0   CALCIUM 8.8     Recent Labs     04/14/23  1151   ASTSGOT 58*   ALTSGPT 35   TBILIRUBIN 0.7   ALKPHOSPHAT 83          Latest Reference Range & Units Most Recent   Lipase 16 - 77 U/L >250 (H)  4/14/23 11:51   (H): Data is abnormally high     IMAGING:   [id number]    (Results Pending)      CT of the abdomen and pelvis with IV contrast.     HISTORY/REASON FOR EXAM:  abdominal pain, n/v/d     TECHNIQUE/EXAM DESCRIPTION: CT scan of the abdomen and pelvis with contrast.  Both automated exposure control and Automated adjustment of tube current based on patient size are utilized.     Total DLP: 932.54 mGy*cm     COMPARISON: 12/7/2012.     FINDINGS:     Lungs: The visualized lung bases and mediastinum are clear.     Solid organs: The liver,  spleen and adrenal glands are grossly unremarkable.  There is a stone in the pancreatic duct measuring 4 mm with proximal pancreatic ductal dilatation within the pancreatic tail.  There are mild peripancreatic inflammatory   changes. There is circumferential wall thickening of the gallbladder.     Kidneys and Bladder/Pelvis: There is no nephrolithiasis or hydronephrosis.  No enhancing renal masses. The kidneys enhance symmetrically. No ureteral or bladder stones are seen. The bladder is grossly unremarkable. No pelvic masses.     Bowel and Appendix: No dilated bowel. No perienteric inflammatory changes. No extraluminal air or free fluid. The appendix is not visualized, however there are no secondary signs of acute appendicitis.     Lymphatics:  No abdominal, pelvic, or retroperitoneal lymphadenopathy.     Other: No concerning osseous lesions. No fractures.  Left hip arthroplasty.     IMPRESSION:     1.  Mild circumferential gallbladder wall thickening and mild peripancreatic inflammatory changes are concerning for cholecystitis and pancreatitis.     2.  Stone in the pancreatic duct with dilatation of the more proximal pancreatic duct.     Adri Lugo  4/14/2023 1:53 PM    ASSESSMENT AND PLAN:   Audrey Delgadillo is a very pleasant 72-year-old female receiving care in the medicine service concern for gallstone pancreatitis    MRI has been requested by the medicine service  Follow-up results when available  Close monitoring and support  Monitor abdominal exam   follow-up labs and imaging    Discussed with patient laparoscopic possible open cholecystectomy after resolution acute phase pancreatitis    ACS but will follow           ____________________________________     Zachery Severino M.D.    DD: 4/14/2023  8:00 PM    ACS NSQIP Surgical Risk Calculator

## 2023-04-15 NOTE — ASSESSMENT & PLAN NOTE
This is Severe Sepsis Present on admission  SIRS criteria identified on my evaluation include: Tachycardia, with heart rate greater than 90 BPM and Tachypnea, with respirations greater than 20 per minute  Clinical indicators of end organ dysfunction include Lactate >2 mmol/L (18.0 mg/dL)  Source is GI  Sepsis protocol initiated  Crystalloid Fluid Administration: Fluid resuscitation ordered per standard protocol - 30 mL/kg per current or ideal body weight  IV antibiotics as appropriate for source of sepsis  Reassessment: I have reassessed the patient's hemodynamic status  Resolved

## 2023-04-15 NOTE — PROGRESS NOTES
0514 Notified MD Suarez pt had a 1.67 second run of pSVT asymptomatic, VSS.  No new orders.  0530 Notified MD Suarez pt is requesting additional anxiety medication, MD Gave verbal order for Diazepam 1.25mg PO once.

## 2023-04-16 ENCOUNTER — APPOINTMENT (OUTPATIENT)
Dept: CARDIOLOGY | Facility: MEDICAL CENTER | Age: 73
DRG: 853 | End: 2023-04-16
Attending: STUDENT IN AN ORGANIZED HEALTH CARE EDUCATION/TRAINING PROGRAM
Payer: MEDICARE

## 2023-04-16 ENCOUNTER — HOSPITAL ENCOUNTER (OUTPATIENT)
Dept: RADIOLOGY | Facility: MEDICAL CENTER | Age: 73
End: 2023-04-16

## 2023-04-16 LAB
ALBUMIN SERPL BCP-MCNC: 3.7 G/DL (ref 3.2–4.9)
ALBUMIN/GLOB SERPL: 1.5 G/DL
ALP SERPL-CCNC: 69 U/L (ref 30–99)
ALT SERPL-CCNC: 65 U/L (ref 2–50)
ANION GAP SERPL CALC-SCNC: 11 MMOL/L (ref 7–16)
AST SERPL-CCNC: 66 U/L (ref 12–45)
BILIRUB SERPL-MCNC: 0.8 MG/DL (ref 0.1–1.5)
BUN SERPL-MCNC: 10 MG/DL (ref 8–22)
CALCIUM ALBUM COR SERPL-MCNC: 7.6 MG/DL (ref 8.5–10.5)
CALCIUM SERPL-MCNC: 7.4 MG/DL (ref 8.5–10.5)
CHLORIDE SERPL-SCNC: 105 MMOL/L (ref 96–112)
CO2 SERPL-SCNC: 18 MMOL/L (ref 20–33)
CREAT SERPL-MCNC: 0.71 MG/DL (ref 0.5–1.4)
ERYTHROCYTE [DISTWIDTH] IN BLOOD BY AUTOMATED COUNT: 44.2 FL (ref 35.9–50)
GFR SERPLBLD CREATININE-BSD FMLA CKD-EPI: 89 ML/MIN/1.73 M 2
GLOBULIN SER CALC-MCNC: 2.5 G/DL (ref 1.9–3.5)
GLUCOSE BLD STRIP.AUTO-MCNC: 77 MG/DL (ref 65–99)
GLUCOSE BLD STRIP.AUTO-MCNC: 93 MG/DL (ref 65–99)
GLUCOSE BLD STRIP.AUTO-MCNC: 96 MG/DL (ref 65–99)
GLUCOSE SERPL-MCNC: 95 MG/DL (ref 65–99)
HCT VFR BLD AUTO: 40.6 % (ref 37–47)
HGB BLD-MCNC: 13.3 G/DL (ref 12–16)
LV EJECT FRACT  99904: 55
MAGNESIUM SERPL-MCNC: 2 MG/DL (ref 1.5–2.5)
MCH RBC QN AUTO: 29.3 PG (ref 27–33)
MCHC RBC AUTO-ENTMCNC: 32.8 G/DL (ref 33.6–35)
MCV RBC AUTO: 89.4 FL (ref 81.4–97.8)
PLATELET # BLD AUTO: 142 K/UL (ref 164–446)
PMV BLD AUTO: 10.2 FL (ref 9–12.9)
POTASSIUM SERPL-SCNC: 4.1 MMOL/L (ref 3.6–5.5)
PROT SERPL-MCNC: 6.2 G/DL (ref 6–8.2)
RBC # BLD AUTO: 4.54 M/UL (ref 4.2–5.4)
SODIUM SERPL-SCNC: 134 MMOL/L (ref 135–145)
WBC # BLD AUTO: 17 K/UL (ref 4.8–10.8)

## 2023-04-16 PROCEDURE — 82962 GLUCOSE BLOOD TEST: CPT | Mod: 91

## 2023-04-16 PROCEDURE — 700102 HCHG RX REV CODE 250 W/ 637 OVERRIDE(OP): Performed by: STUDENT IN AN ORGANIZED HEALTH CARE EDUCATION/TRAINING PROGRAM

## 2023-04-16 PROCEDURE — 700111 HCHG RX REV CODE 636 W/ 250 OVERRIDE (IP): Performed by: STUDENT IN AN ORGANIZED HEALTH CARE EDUCATION/TRAINING PROGRAM

## 2023-04-16 PROCEDURE — 700111 HCHG RX REV CODE 636 W/ 250 OVERRIDE (IP): Performed by: INTERNAL MEDICINE

## 2023-04-16 PROCEDURE — 770001 HCHG ROOM/CARE - MED/SURG/GYN PRIV*

## 2023-04-16 PROCEDURE — 93306 TTE W/DOPPLER COMPLETE: CPT | Mod: 26 | Performed by: INTERNAL MEDICINE

## 2023-04-16 PROCEDURE — 85027 COMPLETE CBC AUTOMATED: CPT

## 2023-04-16 PROCEDURE — 99231 SBSQ HOSP IP/OBS SF/LOW 25: CPT | Performed by: SURGERY

## 2023-04-16 PROCEDURE — 700102 HCHG RX REV CODE 250 W/ 637 OVERRIDE(OP): Performed by: INTERNAL MEDICINE

## 2023-04-16 PROCEDURE — 80053 COMPREHEN METABOLIC PANEL: CPT

## 2023-04-16 PROCEDURE — A9270 NON-COVERED ITEM OR SERVICE: HCPCS | Performed by: STUDENT IN AN ORGANIZED HEALTH CARE EDUCATION/TRAINING PROGRAM

## 2023-04-16 PROCEDURE — 83735 ASSAY OF MAGNESIUM: CPT

## 2023-04-16 PROCEDURE — 700101 HCHG RX REV CODE 250: Performed by: STUDENT IN AN ORGANIZED HEALTH CARE EDUCATION/TRAINING PROGRAM

## 2023-04-16 PROCEDURE — A9270 NON-COVERED ITEM OR SERVICE: HCPCS | Performed by: INTERNAL MEDICINE

## 2023-04-16 PROCEDURE — 99233 SBSQ HOSP IP/OBS HIGH 50: CPT | Performed by: INTERNAL MEDICINE

## 2023-04-16 PROCEDURE — C9113 INJ PANTOPRAZOLE SODIUM, VIA: HCPCS | Performed by: STUDENT IN AN ORGANIZED HEALTH CARE EDUCATION/TRAINING PROGRAM

## 2023-04-16 PROCEDURE — 93306 TTE W/DOPPLER COMPLETE: CPT

## 2023-04-16 RX ORDER — ATORVASTATIN CALCIUM 10 MG/1
10 TABLET, FILM COATED ORAL EVERY EVENING
Status: DISCONTINUED | OUTPATIENT
Start: 2023-04-16 | End: 2023-04-20 | Stop reason: HOSPADM

## 2023-04-16 RX ORDER — AMLODIPINE BESYLATE 10 MG/1
5 TABLET ORAL
Status: DISCONTINUED | OUTPATIENT
Start: 2023-04-16 | End: 2023-04-20 | Stop reason: HOSPADM

## 2023-04-16 RX ADMIN — THERA TABS 1 TABLET: TAB at 07:38

## 2023-04-16 RX ADMIN — METRONIDAZOLE 500 MG: 500 TABLET ORAL at 07:38

## 2023-04-16 RX ADMIN — METRONIDAZOLE 500 MG: 500 TABLET ORAL at 21:49

## 2023-04-16 RX ADMIN — CEFTRIAXONE SODIUM 1000 MG: 10 INJECTION, POWDER, FOR SOLUTION INTRAVENOUS at 17:02

## 2023-04-16 RX ADMIN — FOLIC ACID 1 MG: 1 TABLET ORAL at 07:37

## 2023-04-16 RX ADMIN — LOSARTAN POTASSIUM 50 MG: 50 TABLET, FILM COATED ORAL at 17:17

## 2023-04-16 RX ADMIN — Medication 100 MG: at 07:37

## 2023-04-16 RX ADMIN — LEVOTHYROXINE SODIUM 112 MCG: 0.11 TABLET ORAL at 07:37

## 2023-04-16 RX ADMIN — ONDANSETRON HYDROCHLORIDE 4 MG: 2 SOLUTION INTRAMUSCULAR; INTRAVENOUS at 23:02

## 2023-04-16 RX ADMIN — ENOXAPARIN SODIUM 40 MG: 40 INJECTION SUBCUTANEOUS at 17:16

## 2023-04-16 RX ADMIN — LOSARTAN POTASSIUM 50 MG: 50 TABLET, FILM COATED ORAL at 07:38

## 2023-04-16 RX ADMIN — METRONIDAZOLE 500 MG: 500 TABLET ORAL at 14:08

## 2023-04-16 RX ADMIN — AMLODIPINE BESYLATE 5 MG: 10 TABLET ORAL at 09:46

## 2023-04-16 RX ADMIN — ATORVASTATIN CALCIUM 10 MG: 10 TABLET, FILM COATED ORAL at 18:49

## 2023-04-16 RX ADMIN — LABETALOL HYDROCHLORIDE 10 MG: 5 INJECTION INTRAVENOUS at 07:38

## 2023-04-16 RX ADMIN — PANTOPRAZOLE SODIUM 40 MG: 40 INJECTION, POWDER, FOR SOLUTION INTRAVENOUS at 07:38

## 2023-04-16 RX ADMIN — PANTOPRAZOLE SODIUM 40 MG: 40 INJECTION, POWDER, FOR SOLUTION INTRAVENOUS at 17:16

## 2023-04-16 ASSESSMENT — LIFESTYLE VARIABLES
PAROXYSMAL SWEATS: NO SWEAT VISIBLE
ANXIETY: MILDLY ANXIOUS
AUDITORY DISTURBANCES: NOT PRESENT
TOTAL SCORE: 2
ORIENTATION AND CLOUDING OF SENSORIUM: ORIENTED AND CAN DO SERIAL ADDITIONS
ORIENTATION AND CLOUDING OF SENSORIUM: ORIENTED AND CAN DO SERIAL ADDITIONS
VISUAL DISTURBANCES: NOT PRESENT
AGITATION: NORMAL ACTIVITY
ORIENTATION AND CLOUDING OF SENSORIUM: ORIENTED AND CAN DO SERIAL ADDITIONS
NAUSEA AND VOMITING: NO NAUSEA AND NO VOMITING
AGITATION: NORMAL ACTIVITY
NAUSEA AND VOMITING: MILD NAUSEA WITH NO VOMITING
AGITATION: NORMAL ACTIVITY
PAROXYSMAL SWEATS: NO SWEAT VISIBLE
ANXIETY: *
TOTAL SCORE: 2
VISUAL DISTURBANCES: NOT PRESENT
AUDITORY DISTURBANCES: NOT PRESENT
TREMOR: NO TREMOR
TREMOR: NO TREMOR
HEADACHE, FULLNESS IN HEAD: NOT PRESENT
TREMOR: NO TREMOR
PAROXYSMAL SWEATS: NO SWEAT VISIBLE
TOTAL SCORE: 1
ANXIETY: MILDLY ANXIOUS
NAUSEA AND VOMITING: NO NAUSEA AND NO VOMITING
HEADACHE, FULLNESS IN HEAD: NOT PRESENT
AUDITORY DISTURBANCES: NOT PRESENT
SUBSTANCE_ABUSE: 0
VISUAL DISTURBANCES: NOT PRESENT
HEADACHE, FULLNESS IN HEAD: NOT PRESENT

## 2023-04-16 ASSESSMENT — PAIN DESCRIPTION - PAIN TYPE
TYPE: ACUTE PAIN

## 2023-04-16 ASSESSMENT — ENCOUNTER SYMPTOMS
EYE PAIN: 0
DIARRHEA: 0
VOMITING: 0
CHILLS: 0
ABDOMINAL PAIN: 0
BACK PAIN: 0
SPUTUM PRODUCTION: 0
SHORTNESS OF BREATH: 0
HEADACHES: 0
DIZZINESS: 0
MYALGIAS: 0
PHOTOPHOBIA: 0
FOCAL WEAKNESS: 0
ORTHOPNEA: 0
NECK PAIN: 0
HALLUCINATIONS: 0
SPEECH CHANGE: 0
FEVER: 0
PALPITATIONS: 0
WEIGHT LOSS: 0
DOUBLE VISION: 0
CONSTIPATION: 0
TREMORS: 0
COUGH: 0
NAUSEA: 0
SENSORY CHANGE: 0
BLURRED VISION: 0
TINGLING: 0

## 2023-04-16 NOTE — PROGRESS NOTES
Hospital Medicine Daily Progress Note    Date of Service  4/15/2023    Chief Complaint  Audrey Delgadillo is a 72 y.o. female admitted 4/14/2023 with abdominal pain    Hospital Course    Audrey Delgadillo is a 72 y.o. morbidly obese female with history of alcohol dependence, hypertension, hyperlipidemia, hypothyroidism, GERD who presented 4/14/2023 to [facility] as direct transfer from [facility] for suspected acute gallstone pancreatitis.  Patient reported having sudden onset abdominal pain as she was drinking coffee in the morning to take her thyroid medication.  She stated severe abdominal discomfort, resulting in syncopal episode.  She was taken to [facility] for evaluation.  At [facility], noted to have elevated lipase level, lactic acid of 5.2.  CTAP suggestive of cholecystitis, pancreatitis and stone in pancreatic duct with dilatation of proximal pancreatic duct.  Ultimately, patient was transferred to [facility] for higher level care.      Surgery evaluated her and recommended cholecystectomy on this hospital admission following resolution of acute pancreatitis.    Interval Problem Update    04/15/23    I evaluated and examined her at the bedside.  She reported that she is feeling better.  I discussed with her regarding plan of care and I ordered clear liquid diet.  Continue IV fluid for hydration.  Continue provided pain control.  Continue CIWA protocol for alcohol withdrawal.    I have discussed this patient's plan of care and discharge plan at IDT rounds today with Case Management, Nursing, Nursing leadership, and other members of the IDT team.    Consultants/Specialty  general surgery    Code Status  Full Code    Disposition  Patient is not medically cleared for discharge.   Anticipate discharge to to home with close outpatient follow-up.  I have placed the appropriate orders for post-discharge needs.    Review of Systems  Review of Systems   Constitutional:  Negative for chills, fever and weight loss.   HENT:   Negative for hearing loss and tinnitus.    Eyes:  Negative for blurred vision, double vision, photophobia and pain.   Respiratory:  Negative for cough, sputum production and shortness of breath.    Cardiovascular:  Negative for chest pain, palpitations, orthopnea and leg swelling.   Gastrointestinal:  Positive for abdominal pain and nausea. Negative for constipation, diarrhea and vomiting.   Genitourinary:  Negative for dysuria, frequency and urgency.   Musculoskeletal:  Negative for back pain, joint pain, myalgias and neck pain.   Skin:  Negative for rash.   Neurological:  Negative for dizziness, tingling, tremors, sensory change, speech change, focal weakness and headaches.   Psychiatric/Behavioral:  Negative for hallucinations and substance abuse.    All other systems reviewed and are negative.     Physical Exam  Temp:  [35.9 °C (96.7 °F)-37 °C (98.6 °F)] 35.9 °C (96.7 °F)  Pulse:  [] 90  Resp:  [13-30] 23  BP: (117-156)/(60-76) 135/65  SpO2:  [87 %-99 %] 95 %    Physical Exam  Vitals reviewed.   Constitutional:       General: She is not in acute distress.     Appearance: She is ill-appearing.   HENT:      Head: Normocephalic and atraumatic.      Nose: No congestion.   Eyes:      General:         Right eye: No discharge.         Left eye: No discharge.      Pupils: Pupils are equal, round, and reactive to light.   Cardiovascular:      Rate and Rhythm: Normal rate and regular rhythm.      Pulses: Normal pulses.      Heart sounds: Normal heart sounds. No murmur heard.  Pulmonary:      Effort: Pulmonary effort is normal. No respiratory distress.      Breath sounds: Normal breath sounds. No stridor.   Abdominal:      General: Bowel sounds are normal. There is no distension.      Palpations: Abdomen is soft.      Tenderness: There is abdominal tenderness (Mild).   Musculoskeletal:         General: No swelling or tenderness. Normal range of motion.      Cervical back: Normal range of motion. No rigidity.    Skin:     General: Skin is warm.      Capillary Refill: Capillary refill takes less than 2 seconds.      Coloration: Skin is not jaundiced or pale.      Findings: No bruising.   Neurological:      General: No focal deficit present.      Mental Status: She is alert and oriented to person, place, and time.      Cranial Nerves: No cranial nerve deficit.   Psychiatric:         Mood and Affect: Mood normal.         Behavior: Behavior normal.       Fluids    Intake/Output Summary (Last 24 hours) at 4/15/2023 1711  Last data filed at 4/15/2023 1528  Gross per 24 hour   Intake 2712.95 ml   Output 400 ml   Net 2312.95 ml       Laboratory  Recent Labs     04/14/23  1151 04/15/23  0200   WBC 6.9 15.1*   RBC 5.23 4.36   HEMOGLOBIN 15.3 12.8   HEMATOCRIT 45.8 38.6   MCV 87.6 88.5   MCH 29.3 29.4   MCHC 33.4 33.2*   RDW 12.5 42.5   PLATELETCT 167 149*   MPV 9.6 10.1     Recent Labs     04/14/23  1151 04/15/23  0336   SODIUM 138 137   POTASSIUM 3.2* 5.1   CHLORIDE 100 106   CO2 24 21   GLUCOSE 192* 130*   BUN 18 11   CREATININE 1.0 1.01   CALCIUM 8.8 7.4*                   Imaging  [id number]/O    (Results Pending)   EC-ECHOCARDIOGRAM COMPLETE W/O CONT    (Results Pending)        Assessment/Plan  * Acute gallstone pancreatitis  Assessment & Plan  Suspected  Elevated lipase  Cholecystitis and pancreatitis noted on CTAP  IVF  PPI  Continue to provide her pain control with IV Dilaudid every 3 hourly.    Alcohol dependence (HCC)  Assessment & Plan  Multivitamins  I am continuing CIWA protocol with Ativan for alcohol withdrawal.      Hyperglycemia  Assessment & Plan  Continue insulin sliding scale with hypoglycemia protocol.    Hypomagnesemia  Assessment & Plan  Continue to monitor and replace as needed.  I ordered magnesium level for tomorrow morning.    Hypokalemia  Assessment & Plan  Continue to monitor and replace as needed.  I ordered CMP for tomorrow morning.    ACP (advance care planning)  Assessment & Plan  Admitting  hospitalist discussed with patient in IMCU.  Patient is agreeable for invasive (CPR/defibrillation/intubation or mechanical ventilation) and noninvasive medical management.  Additionally, she is agreeable for IVF, antibiotic, possible ERCP and/or surgical intervention. FULL code status confirmed.  ACP: 17mins    Lactic acidosis  Assessment & Plan  Likely secondary to severe sepsis  IVF  Antibiotic  Reviewed by me    Severe sepsis (HCC)  Assessment & Plan  This is Severe Sepsis Present on admission  SIRS criteria identified on my evaluation include: Tachycardia, with heart rate greater than 90 BPM and Tachypnea, with respirations greater than 20 per minute  Clinical indicators of end organ dysfunction include Lactate >2 mmol/L (18.0 mg/dL)  Source is GI  Sepsis protocol initiated  Crystalloid Fluid Administration: Fluid resuscitation ordered per standard protocol - 30 mL/kg per current or ideal body weight  IV antibiotics as appropriate for source of sepsis  Reassessment: I have reassessed the patient's hemodynamic status    Change antibiotic to IV ceftriaxone and metronidazole.  Follow culture results.      Choledocholithiasis with acute cholecystitis  Assessment & Plan  Suspected  [place]AP from [facility]: Mild circumferential gallbladder wall thickening and mild peripancreatic inflammatory changes are concerning for cholecystitis and pancreatitis.  Stone in the pancreatic duct with dilatation of more proximal pancreatic duct.    Continue IV fluid.  Continue to provide pain control.  I changed antibiotic to IV ceftriaxone and IV Flagyl.  I started on clear liquid diet.  Surgery is following that.  MRCP ordered currently its pending.    Cholecystitis  Assessment & Plan  Noted on CTAP  IVF  Antibiotic  Surgery evaluated her and made recommendations.    Schwannoma of cranial nerve (HCC)- (present on admission)  Assessment & Plan  Per history   outpatient follow-up    Class 1 obesity in adult  Assessment & Plan  Diet and  lifestyle modification  Body mass index is 34.1 kg/m².      Gastroesophageal reflux disease without esophagitis- (present on admission)  Assessment & Plan  Continue IV pantoprazole    History of breast cancer- (present on admission)  Assessment & Plan  History of  Treated with surgery, chemoradiation and subsequent bilateral oophorectomy    Hypothyroidism- (present on admission)  Assessment & Plan  Continue outpatient Synthroid    Dyslipidemia- (present on admission)  Assessment & Plan  Continue outpatient statin    Essential hypertension- (present on admission)  Assessment & Plan  Continue losartan 50 mg         I discussed plan of care during multidisciplinary rounds regarding patient's current medical condition and plan of care.      VTE prophylaxis: enoxaparin ppx    I have performed a physical exam and reviewed and updated ROS and Plan today (4/15/2023). In review of yesterday's note (4/14/2023), there are no changes except as documented above.

## 2023-04-16 NOTE — PROGRESS NOTES
4 Eyes Skin Assessment Completed by THALIA Ambriz and THALIA Yen.    Head WDL  Ears WDL  Nose WDL  Mouth WDL  Neck WDL  Breast/Chest Scar bilateral breast from piror reduction/ breast cancer tx  Shoulder Blades WDL  Spine WDL  (R) Arm/Elbow/Hand Edema +1 pitting  (L) Arm/Elbow/Hand Edema +1 pitting  Abdomen WDL  Groin WDL  Scrotum/Coccyx/Buttocks Redness, Blanching, Non-Blanching, and Excoriation: area cleansed, barrier whips and barrier paste applied   (R) Leg Edema +1 pitting, posterior abrasion   (L) Leg Edema +1 pitting   (R) Heel/Foot/Toe Edema +1 Pitting  (L) Heel/Foot/Toe Edema +1 pitting           Devices In Places Pulse Ox, SCD's, and BLANCO's      Interventions In Place Pillows, Barrier Cream, Heels Loaded W/Pillows, and Pressure Redistribution Mattress    Possible Skin Injury Yes    Pictures Uploaded Into Epic No, needs to be completed  Wound Consult Placed Yes  RN Wound Prevention Protocol Ordered Yes

## 2023-04-16 NOTE — CARE PLAN
"The patient is Stable - Low risk of patient condition declining or worsening    Shift Goals  Clinical Goals: Hemodynamic stability, MRI  Patient Goals: Eat \"real food\", rest  Family Goals: álvaro    Progress made toward(s) clinical / shift goals:    Problem: Knowledge Deficit - Standard  Goal: Patient and family/care givers will demonstrate understanding of plan of care, disease process/condition, diagnostic tests and medications  Outcome: Progressing     Problem: Hemodynamics  Goal: Patient's hemodynamics, fluid balance and neurologic status will be stable or improve  Outcome: Progressing       Problem: Fluid Volume  Goal: Fluid volume balance will be maintained  Outcome: Progressing     Problem: Psychosocial  Goal: Patient's level of anxiety will decrease  Outcome: Progressing           "

## 2023-04-16 NOTE — PROGRESS NOTES
DATE: 4/16/2023    INTERVAL EVENTS:  Transfer to [facility].    PHYSICAL EXAMINATION:  Vital Signs: /84   Pulse 92   Temp 36.4 °C (97.5 °F) (Temporal)   Resp 18   Ht 1.524 m (5')   Wt 76.9 kg (169 lb 8.5 oz)   SpO2 90%     Abdominal tenderness resolved.    LABORATORY VALUES:   Recent Labs     04/14/23  1151 04/15/23  0200 04/16/23  0236   WBC 6.9 15.1* 17.0*   RBC 5.23 4.36 4.54   HEMOGLOBIN 15.3 12.8 13.3   HEMATOCRIT 45.8 38.6 40.6   MCV 87.6 88.5 89.4   MCH 29.3 29.4 29.3   MCHC 33.4 33.2* 32.8*   RDW 12.5 42.5 44.2   PLATELETCT 167 149* 142*   MPV 9.6 10.1 10.2     Recent Labs     04/14/23  1151 04/15/23  0200 04/15/23  0336 04/16/23  0236   ASTSGOT 58*  --  93* 66*   ALTSGPT 35  --  69* 65*   TBILIRUBIN 0.7  --  0.9 0.8   IBILIRUBIN  --  0.4  --   --    DBILIRUBIN  --  0.3  --   --    ALKPHOSPHAT 83  --  61 69   GLOBULIN  --   --  2.1 2.5       ASSESSMENT AND PLAN:   Resolving gallstone pancreatitis.  No evidence for current biliary obstruction.  Laparoscopic cholecystectomy prior to discharge.       ____________________________________     Cj Rivas M.D.    DD: 4/16/2023  4:37 PM

## 2023-04-16 NOTE — PROGRESS NOTES
Assumed care of patient at 1330. Telephone report received. Patient transferred to T4 from T6 Assessment complete.  AA&Ox4. Denies CP/SOB. Room air  Reporting no pain. Declined intervention at this time. Educated patient regarding pharmacologic and non pharmacologic modalities for pain management.  Skin per flow sheets. See new skin assessment note, bottom is red, ordered whips, area cleansed and barrier paste applied   Tolerating CHO diet. Denies N/V.  + void. + BM. Last BM 4/16  Pt ambulates SBA to bathroom  Fall prevention measures in place per flowsheets.  SCD's in use/Educated on SCD use, patient declined at this time, ambulating frequently, Pharmacologic VTE prophylaxis in use.  Plan of care discussed, all questions answered.  Educated regarding importance of oral care. Oral care kit at bedside. Call light is within reach, treaded slipper socks on, bed in lowest/ locked position, hourly rounding in place, all needs met at this time.

## 2023-04-16 NOTE — CARE PLAN
The patient is Watcher - Medium risk of patient condition declining or worsening    Shift Goals  Clinical Goals: Hemodymamic stability  Patient Goals: rest  Family Goals: álvaro        Patient is not progressing towards the following goals:      Problem: Knowledge Deficit - Standard  Goal: Patient and family/care givers will demonstrate understanding of plan of care, disease process/condition, diagnostic tests and medications  Outcome: Not Progressing       Problem: Fall Risk  Goal: Patient will remain free from falls  Outcome: Progressing     Problem: Pain - Standard  Goal: Alleviation of pain or a reduction in pain to the patient’s comfort goal  Outcome: Progressing

## 2023-04-17 PROBLEM — D69.6 THROMBOCYTOPENIA (HCC): Status: ACTIVE | Noted: 2023-04-17

## 2023-04-17 LAB
ALBUMIN SERPL BCP-MCNC: 3.9 G/DL (ref 3.2–4.9)
ALBUMIN/GLOB SERPL: 1.7 G/DL
ALP SERPL-CCNC: 84 U/L (ref 30–99)
ALT SERPL-CCNC: 40 U/L (ref 2–50)
ANION GAP SERPL CALC-SCNC: 13 MMOL/L (ref 7–16)
AST SERPL-CCNC: 34 U/L (ref 12–45)
BILIRUB SERPL-MCNC: 0.7 MG/DL (ref 0.1–1.5)
BUN SERPL-MCNC: 10 MG/DL (ref 8–22)
CALCIUM ALBUM COR SERPL-MCNC: 7.7 MG/DL (ref 8.5–10.5)
CALCIUM SERPL-MCNC: 7.6 MG/DL (ref 8.5–10.5)
CHLORIDE SERPL-SCNC: 99 MMOL/L (ref 96–112)
CO2 SERPL-SCNC: 20 MMOL/L (ref 20–33)
CREAT SERPL-MCNC: 0.63 MG/DL (ref 0.5–1.4)
ERYTHROCYTE [DISTWIDTH] IN BLOOD BY AUTOMATED COUNT: 39.8 FL (ref 35.9–50)
GFR SERPLBLD CREATININE-BSD FMLA CKD-EPI: 93 ML/MIN/1.73 M 2
GLOBULIN SER CALC-MCNC: 2.3 G/DL (ref 1.9–3.5)
GLUCOSE BLD STRIP.AUTO-MCNC: 78 MG/DL (ref 65–99)
GLUCOSE BLD STRIP.AUTO-MCNC: 82 MG/DL (ref 65–99)
GLUCOSE BLD STRIP.AUTO-MCNC: 85 MG/DL (ref 65–99)
GLUCOSE BLD STRIP.AUTO-MCNC: 87 MG/DL (ref 65–99)
GLUCOSE BLD STRIP.AUTO-MCNC: 95 MG/DL (ref 65–99)
GLUCOSE SERPL-MCNC: 85 MG/DL (ref 65–99)
HCT VFR BLD AUTO: 38 % (ref 37–47)
HGB BLD-MCNC: 13.1 G/DL (ref 12–16)
MCH RBC QN AUTO: 29.2 PG (ref 27–33)
MCHC RBC AUTO-ENTMCNC: 34.5 G/DL (ref 33.6–35)
MCV RBC AUTO: 84.6 FL (ref 81.4–97.8)
PLATELET # BLD AUTO: 126 K/UL (ref 164–446)
PMV BLD AUTO: 10.4 FL (ref 9–12.9)
POTASSIUM SERPL-SCNC: 3.6 MMOL/L (ref 3.6–5.5)
PROT SERPL-MCNC: 6.2 G/DL (ref 6–8.2)
RBC # BLD AUTO: 4.49 M/UL (ref 4.2–5.4)
SODIUM SERPL-SCNC: 132 MMOL/L (ref 135–145)
WBC # BLD AUTO: 11.8 K/UL (ref 4.8–10.8)

## 2023-04-17 PROCEDURE — 700102 HCHG RX REV CODE 250 W/ 637 OVERRIDE(OP): Performed by: INTERNAL MEDICINE

## 2023-04-17 PROCEDURE — 700102 HCHG RX REV CODE 250 W/ 637 OVERRIDE(OP): Performed by: STUDENT IN AN ORGANIZED HEALTH CARE EDUCATION/TRAINING PROGRAM

## 2023-04-17 PROCEDURE — 80053 COMPREHEN METABOLIC PANEL: CPT

## 2023-04-17 PROCEDURE — 700111 HCHG RX REV CODE 636 W/ 250 OVERRIDE (IP): Performed by: STUDENT IN AN ORGANIZED HEALTH CARE EDUCATION/TRAINING PROGRAM

## 2023-04-17 PROCEDURE — 99232 SBSQ HOSP IP/OBS MODERATE 35: CPT | Performed by: INTERNAL MEDICINE

## 2023-04-17 PROCEDURE — 36415 COLL VENOUS BLD VENIPUNCTURE: CPT

## 2023-04-17 PROCEDURE — A9270 NON-COVERED ITEM OR SERVICE: HCPCS | Performed by: INTERNAL MEDICINE

## 2023-04-17 PROCEDURE — 700111 HCHG RX REV CODE 636 W/ 250 OVERRIDE (IP): Performed by: INTERNAL MEDICINE

## 2023-04-17 PROCEDURE — 82962 GLUCOSE BLOOD TEST: CPT | Mod: 91

## 2023-04-17 PROCEDURE — 99231 SBSQ HOSP IP/OBS SF/LOW 25: CPT | Performed by: NURSE PRACTITIONER

## 2023-04-17 PROCEDURE — 770001 HCHG ROOM/CARE - MED/SURG/GYN PRIV*

## 2023-04-17 PROCEDURE — C9113 INJ PANTOPRAZOLE SODIUM, VIA: HCPCS | Performed by: STUDENT IN AN ORGANIZED HEALTH CARE EDUCATION/TRAINING PROGRAM

## 2023-04-17 PROCEDURE — A9270 NON-COVERED ITEM OR SERVICE: HCPCS | Performed by: STUDENT IN AN ORGANIZED HEALTH CARE EDUCATION/TRAINING PROGRAM

## 2023-04-17 PROCEDURE — 85027 COMPLETE CBC AUTOMATED: CPT

## 2023-04-17 RX ORDER — LORAZEPAM 1 MG/1
0.5 TABLET ORAL ONCE
Status: DISPENSED | OUTPATIENT
Start: 2023-04-17 | End: 2023-04-18

## 2023-04-17 RX ORDER — FAMOTIDINE 20 MG/1
20 TABLET, FILM COATED ORAL 2 TIMES DAILY
Status: DISCONTINUED | OUTPATIENT
Start: 2023-04-18 | End: 2023-04-20 | Stop reason: HOSPADM

## 2023-04-17 RX ORDER — HYDROXYZINE HYDROCHLORIDE 25 MG/1
25 TABLET, FILM COATED ORAL ONCE
Status: DISCONTINUED | OUTPATIENT
Start: 2023-04-17 | End: 2023-04-17

## 2023-04-17 RX ADMIN — METRONIDAZOLE 500 MG: 500 TABLET ORAL at 05:26

## 2023-04-17 RX ADMIN — CEFTRIAXONE SODIUM 1000 MG: 10 INJECTION, POWDER, FOR SOLUTION INTRAVENOUS at 14:03

## 2023-04-17 RX ADMIN — ONDANSETRON HYDROCHLORIDE 4 MG: 2 SOLUTION INTRAMUSCULAR; INTRAVENOUS at 21:21

## 2023-04-17 RX ADMIN — ENOXAPARIN SODIUM 40 MG: 40 INJECTION SUBCUTANEOUS at 17:49

## 2023-04-17 RX ADMIN — METRONIDAZOLE 500 MG: 500 TABLET ORAL at 21:32

## 2023-04-17 RX ADMIN — THERA TABS 1 TABLET: TAB at 10:08

## 2023-04-17 RX ADMIN — LOSARTAN POTASSIUM 50 MG: 50 TABLET, FILM COATED ORAL at 17:48

## 2023-04-17 RX ADMIN — ONDANSETRON HYDROCHLORIDE 4 MG: 2 SOLUTION INTRAMUSCULAR; INTRAVENOUS at 04:27

## 2023-04-17 RX ADMIN — FOLIC ACID 1 MG: 1 TABLET ORAL at 10:08

## 2023-04-17 RX ADMIN — PANTOPRAZOLE SODIUM 40 MG: 40 INJECTION, POWDER, FOR SOLUTION INTRAVENOUS at 05:24

## 2023-04-17 RX ADMIN — METRONIDAZOLE 500 MG: 500 TABLET ORAL at 14:03

## 2023-04-17 RX ADMIN — LOSARTAN POTASSIUM 50 MG: 50 TABLET, FILM COATED ORAL at 05:27

## 2023-04-17 RX ADMIN — LEVOTHYROXINE SODIUM 112 MCG: 0.11 TABLET ORAL at 05:26

## 2023-04-17 RX ADMIN — Medication 100 MG: at 10:08

## 2023-04-17 RX ADMIN — ATORVASTATIN CALCIUM 10 MG: 10 TABLET, FILM COATED ORAL at 17:48

## 2023-04-17 ASSESSMENT — ENCOUNTER SYMPTOMS
PALPITATIONS: 0
ABDOMINAL PAIN: 1
SPUTUM PRODUCTION: 0
FOCAL WEAKNESS: 0
CONSTIPATION: 0
FEVER: 0
DIZZINESS: 0
SENSORY CHANGE: 0
NECK PAIN: 0
COUGH: 0
MYALGIAS: 0
NAUSEA: 0
DIARRHEA: 0
SHORTNESS OF BREATH: 0
CHILLS: 0
HALLUCINATIONS: 0
VOMITING: 0
WEIGHT LOSS: 0

## 2023-04-17 ASSESSMENT — PAIN DESCRIPTION - PAIN TYPE
TYPE: ACUTE PAIN

## 2023-04-17 ASSESSMENT — LIFESTYLE VARIABLES: SUBSTANCE_ABUSE: 0

## 2023-04-17 NOTE — CARE PLAN
The patient is Stable - Low risk of patient condition declining or worsening    Shift Goals  Clinical Goals: Pain control, skin integrity, mobility, MRI  Patient Goals: Pain control, toileting, sleep  Family Goals:    Progress made toward(s) clinical / shift goals:  Patient denied pain medication needs. BLE pain reported. NOC hospitalist contacted. Patient refused pain/anxiety medications despite education. Patient toleting Q90 minutes. Ellen cream/perineal washcloths utilized. Pateint slept intermittent during shift.     Patient is not progressing towards the following goals: NA.

## 2023-04-17 NOTE — PROGRESS NOTES
Hospital Medicine Daily Progress Note    Date of Service  4/17/2023    Chief Complaint  Audrey Delgadillo is a 72 y.o. female admitted 4/14/2023 with abdominal pain    Hospital Course    Audrey Delgadillo is a 72 y.o. morbidly obese female with history of alcohol dependence, hypertension, hyperlipidemia, hypothyroidism, GERD who presented 4/14/2023 to [facility] as direct transfer from [facility] for suspected acute gallstone pancreatitis.  Patient reported having sudden onset abdominal pain as she was drinking coffee in the morning to take her thyroid medication.  She stated severe abdominal discomfort, resulting in syncopal episode.  She was taken to [facility] for evaluation.  At [facility], noted to have elevated lipase level, lactic acid of 5.2.  CTAP suggestive of cholecystitis, pancreatitis and stone in pancreatic duct with dilatation of proximal pancreatic duct.  Ultimately, patient was transferred to [facility] for higher level care. Surgery evaluated her and recommended cholecystectomy on this hospital admission following resolution of acute pancreatitis. Patient downgraded to surgical floor, pending MRCP.     Interval Problem Update  04/16//23  I evaluated and examined her at the bedside.  She reported that she is feeling better and denies any acute complaints but  She has been tolerating clear liquid diet.  Her MRCP is currently pending.  I discussed plan of care with patient and plan is to advance her diet as tolerated.  Surgery Dr. Rivas evaluated her and recommended cholecystectomy prior to discharge.    4/17 Vitals stable on 2 L NC. WBC improving 11.8. Platelets down trending 126. MRCP pending. Still with some mild abdominal pain. Denies nausea, tolerating regular diet.     I have discussed this patient's plan of care and discharge plan at IDT rounds today with Case Management, Nursing, Nursing leadership, and other members of the IDT team.    Consultants/Specialty  general surgery    Code Status  Full  Code    Disposition  Patient is not medically cleared for discharge.   Anticipate discharge to to home with close outpatient follow-up.  I have placed the appropriate orders for post-discharge needs.    Review of Systems  Review of Systems   Constitutional:  Negative for chills, fever and weight loss.   Respiratory:  Negative for cough, sputum production and shortness of breath.    Cardiovascular:  Negative for chest pain, palpitations and leg swelling.   Gastrointestinal:  Positive for abdominal pain. Negative for constipation, diarrhea, nausea and vomiting.   Musculoskeletal:  Negative for myalgias and neck pain.   Skin:  Negative for rash.   Neurological:  Negative for dizziness, sensory change and focal weakness.   Psychiatric/Behavioral:  Negative for hallucinations and substance abuse.    All other systems reviewed and are negative.     Physical Exam  Temp:  [36 °C (96.8 °F)-36.6 °C (97.9 °F)] 36 °C (96.8 °F)  Pulse:  [72-99] 80  Resp:  [16-18] 18  BP: (129-153)/(77-94) 145/85  SpO2:  [89 %-97 %] 94 %    Physical Exam  Vitals reviewed.   Constitutional:       General: She is not in acute distress.     Appearance: She is ill-appearing.      Comments: Eating breakfast   HENT:      Head: Normocephalic and atraumatic.   Eyes:      General:         Right eye: No discharge.         Left eye: No discharge.      Conjunctiva/sclera: Conjunctivae normal.   Cardiovascular:      Rate and Rhythm: Normal rate and regular rhythm.      Pulses: Normal pulses.      Heart sounds: Normal heart sounds. No murmur heard.  Pulmonary:      Effort: Pulmonary effort is normal. No respiratory distress.      Breath sounds: Normal breath sounds. No stridor.   Abdominal:      General: There is no distension.      Palpations: Abdomen is soft.      Tenderness: There is abdominal tenderness (mild).   Musculoskeletal:         General: No swelling or tenderness. Normal range of motion.      Cervical back: Normal range of motion. No rigidity.    Skin:     General: Skin is warm.      Capillary Refill: Capillary refill takes less than 2 seconds.      Coloration: Skin is not jaundiced or pale.      Findings: No bruising.   Neurological:      General: No focal deficit present.      Mental Status: She is alert and oriented to person, place, and time.      Cranial Nerves: No cranial nerve deficit.   Psychiatric:         Mood and Affect: Mood normal.         Behavior: Behavior normal.       Fluids    Intake/Output Summary (Last 24 hours) at 4/17/2023 1427  Last data filed at 4/17/2023 1116  Gross per 24 hour   Intake 240 ml   Output --   Net 240 ml         Laboratory  Recent Labs     04/15/23  0200 04/16/23  0236 04/17/23  0553   WBC 15.1* 17.0* 11.8*   RBC 4.36 4.54 4.49   HEMOGLOBIN 12.8 13.3 13.1   HEMATOCRIT 38.6 40.6 38.0   MCV 88.5 89.4 84.6   MCH 29.4 29.3 29.2   MCHC 33.2* 32.8* 34.5   RDW 42.5 44.2 39.8   PLATELETCT 149* 142* 126*   MPV 10.1 10.2 10.4     Recent Labs     04/15/23  0336 04/16/23  0236 04/17/23  0553   SODIUM 137 134* 132*   POTASSIUM 5.1 4.1 3.6   CHLORIDE 106 105 99   CO2 21 18* 20   GLUCOSE 130* 95 85   BUN 11 10 10   CREATININE 1.01 0.71 0.63   CALCIUM 7.4* 7.4* 7.6*                   Imaging  EC-ECHOCARDIOGRAM COMPLETE W/O CONT   Final Result      OUTSIDE IMAGES-US ABDOMEN   Final Result      OUTSIDE IMAGES-CT ABDOMEN /PELVIS   Final Result      OUTSIDE IMAGES-DX CHEST   Final Result      [id number]/O    (Results Pending)          Assessment/Plan  * Acute gallstone pancreatitis  Assessment & Plan  Suspected  Elevated lipase  Cholecystitis and pancreatitis noted on CTAP  IVF  PPI  Pain control   General surgery evaluated her and recommended that patient will get cholecystectomy prior to discharge.    Thrombocytopenia (HCC)  Assessment & Plan  plt 126 today   No signs of bleeding, likely from acute infection  Continue to trend CBC    Alcohol dependence (HCC)  Assessment & Plan  Multivitamins  I am continuing CIWA protocol with Ativan  for alcohol withdrawal.  She does not show signs of alcohol withdrawal I discontinued CIWA protocol on April 16, 2023.    Hyperglycemia  Assessment & Plan  Continue insulin sliding scale with hypoglycemia protocol.    Hypomagnesemia  Assessment & Plan  Continue to monitor and replace as needed.  I ordered magnesium level for tomorrow morning.    Hypokalemia  Assessment & Plan  Continue to monitor and replace as needed.  I ordered CMP for tomorrow morning April 17, 2023.    ACP (advance care planning)  Assessment & Plan  Full code    Lactic acidosis  Assessment & Plan  Likely secondary to severe sepsis  IVF  Antibiotic  Now resolved.    Severe sepsis (HCC)  Assessment & Plan  This is Severe Sepsis Present on admission  SIRS criteria identified on my evaluation include: Tachycardia, with heart rate greater than 90 BPM and Tachypnea, with respirations greater than 20 per minute  Clinical indicators of end organ dysfunction include Lactate >2 mmol/L (18.0 mg/dL)  Source is GI  Sepsis protocol initiated  Crystalloid Fluid Administration: Fluid resuscitation ordered per standard protocol - 30 mL/kg per current or ideal body weight  IV antibiotics as appropriate for source of sepsis  Reassessment: I have reassessed the patient's hemodynamic status    Change antibiotic to IV ceftriaxone and metronidazole.  Culture- ntd     Choledocholithiasis with acute cholecystitis  Assessment & Plan  Suspected  [place] [facility] Mild circumferential gallbladder wall thickening and mild peripancreatic inflammatory changes are concerning for cholecystitis and pancreatitis.  Stone in the pancreatic duct with dilatation of more proximal pancreatic duct.    Continue IV fluid.  Continue to provide pain control.  I changed antibiotic to IV ceftriaxone and IV Flagyl.  Surgery is following that.  MRCP ordered currently its pending.  Regular diet     Cholecystitis  Assessment & Plan  Noted on CTAP  IVF  Antibiotic  Surgery evaluated her and made  recommendations.    Schwannoma of cranial nerve (HCC)- (present on admission)  Assessment & Plan  Per history   outpatient follow-up    Class 1 obesity in adult  Assessment & Plan  Diet and lifestyle modification  Body mass index is 34.1 kg/m².      Gastroesophageal reflux disease without esophagitis- (present on admission)  Assessment & Plan  Continue IV pantoprazole    History of breast cancer- (present on admission)  Assessment & Plan  History of  Treated with surgery, chemoradiation and subsequent bilateral oophorectomy    Hypothyroidism- (present on admission)  Assessment & Plan  Continue outpatient Synthroid    Dyslipidemia- (present on admission)  Assessment & Plan  Continue outpatient statin    Essential hypertension- (present on admission)  Assessment & Plan  Continue losartan 50 mg          VTE prophylaxis: enoxaparin ppx    I have performed a physical exam and reviewed and updated ROS and Plan today (4/17/2023). In review of yesterday's note (4/16/2023), there are no changes except as documented above.

## 2023-04-17 NOTE — PROGRESS NOTES
Bedside report received from night shift nurse. Assumed care at 0645.   Pt A&Ox4.  Tolerating CHO diet, denies n/v. Hypoactive bowel sounds, passing flatus, LBM 4/17/23. IV access through 20g RFA and 20g BELLA that is SL.  Edematous extremities. Coccyx red and blanching.  Saturating >90% on RA.  Pt ambulates SBA w/ FWW.  Pain is controlled through medication orders. Updated on plan of care. Safety education provided. Bed locked in low. Call light within reach. Rounding in place.

## 2023-04-17 NOTE — WOUND TEAM
[facility]  Inpatient Services  Wound and Skin Care Brief Evaluation    Admission Date: 4/14/2023     Last order of IP CONSULT TO WOUND CARE was found on 4/16/2023 from Hospital Encounter on 4/14/2023     HPI, PMH, SH: Reviewed    No chief complaint on file.    Diagnosis: Gall stone pancreatitis [K85.10]    Unit where seen by Wound Team: [id number]     Wound consult placed regarding Bilateral arms and legs as well as sacrococcygeal area. Chart and images reviewed. This discussed with bedside RN. This RN in to assess patient. Pt pleasant and agreeable sitting at edge of bed. Bilateral ears assessed and are intact, Pts arms intact, Sacrococcygeal area intact with some redness. Sacral offloading dressing applied. Bilateral heels assessed and are intact, heel offloading dressing applied. Bilateral LE with some edema. Tubigrip E applied for mild compression. No pressure injuries or advanced wound care needs identified. Wound consult completed. No further follow up unless indicated and consulted.                           RSKIN:   CURRENTLY IN PLACE (X), APPLIED THIS VISIT (A), ORDERED (O):   Q shift Robert:  X  Q shift pressure point assessments:  X    Surface/Positioning   Standard/Trauma Bed     X     Low Airloss          Bariatric BASILIO     ICU BASILIO                              Waffle cushion        Waffle Overlay       X   Reposition q 2 hours    X  TAPs Turning system     Z Per Pillow     Offloading/Redistribution   Sacral Offloading Dressing (Silicone dressing)   Applied  Heel Offloading Dressing (Silicone dressing)    Applied     Heel float boots (Prevalon boot)             Float Heels off Bed with Pillows           Respiratory   Silicone O2 tubing   X      Gray Foam Ear protectors   X  Cannula fixation Device (Tender )          High flow offloading Clip    Elastic head band offloading device      Anchorfast                                                         Trach with Optifoam split foam              Containment/Moisture Prevention Continent    Rectal tube or BMS    Purwick/Condom Cath        Hudson Catheter    Barrier wipes           Barrier paste       Antifungal tx      Interdry        Mobilization       Up to chair   X     Ambulate    X  PT/OT      Nutrition       Dietician        Diabetes Education      PO  X   TF     TPN     NPO   # days     Other

## 2023-04-17 NOTE — PROGRESS NOTES
Hospital Medicine Daily Progress Note    Date of Service  4/16/2023    Chief Complaint  Audrey Delgadillo is a 72 y.o. female admitted 4/14/2023 with abdominal pain    Hospital Course    Audrey Delgadillo is a 72 y.o. morbidly obese female with history of alcohol dependence, hypertension, hyperlipidemia, hypothyroidism, GERD who presented 4/14/2023 to [facility] [place] [facility] [place] [facility] [place] suspected acute gallstone pancreatitis.  Patient reported having sudden onset abdominal pain as she was drinking coffee in the morning to take her thyroid medication.  She stated severe abdominal discomfort, resulting in syncopal episode.  She was taken to [facility] [place].  At [facility], noted to have elevated lipase level, lactic acid of 5.2.  CTAP suggestive of cholecystitis, pancreatitis and stone in pancreatic duct with dilatation of proximal pancreatic duct.  Ultimately, patient was transferred to [facility] for higher level care.      Surgery evaluated her and recommended cholecystectomy on this hospital admission following resolution of acute pancreatitis.    Interval Problem Update    04/16//23    I evaluated and examined her at the bedside.  She reported that she is feeling better and denies any acute complaints but  She has been tolerating clear liquid diet.  Her MRCP is currently pending.  I discussed plan of care with patient and plan is to advance her diet as tolerated.  Surgery Dr. Rivas evaluated her and recommended cholecystectomy prior to discharge.    I have discussed this patient's plan of care and discharge plan at IDT rounds today with Case Management, Nursing, Nursing leadership, and other members of the IDT team.    Consultants/Specialty  general surgery    Code Status  Full Code    Disposition  Patient is not medically cleared for discharge.   Anticipate discharge to to home with close outpatient follow-up.  I have placed the appropriate orders for post-discharge needs.    Review of Systems  Review  of Systems   Constitutional:  Negative for chills, fever and weight loss.   HENT:  Negative for hearing loss and tinnitus.    Eyes:  Negative for blurred vision, double vision, photophobia and pain.   Respiratory:  Negative for cough, sputum production and shortness of breath.    Cardiovascular:  Negative for chest pain, palpitations, orthopnea and leg swelling.   Gastrointestinal:  Negative for abdominal pain, constipation, diarrhea, nausea and vomiting.   Genitourinary:  Negative for dysuria, frequency and urgency.   Musculoskeletal:  Negative for back pain, joint pain, myalgias and neck pain.   Skin:  Negative for rash.   Neurological:  Negative for dizziness, tingling, tremors, sensory change, speech change, focal weakness and headaches.   Psychiatric/Behavioral:  Negative for hallucinations and substance abuse.    All other systems reviewed and are negative.     Physical Exam  Temp:  [36.2 °C (97.2 °F)-36.4 °C (97.5 °F)] 36.4 °C (97.5 °F)  Pulse:  [] 92  Resp:  [13-29] 18  BP: (122-185)/(64-93) 129/84  SpO2:  [89 %-96 %] 90 %    Physical Exam  Vitals reviewed.   Constitutional:       General: She is not in acute distress.     Appearance: She is ill-appearing.   HENT:      Head: Normocephalic and atraumatic.      Nose: No congestion.   Eyes:      General:         Right eye: No discharge.         Left eye: No discharge.      Pupils: Pupils are equal, round, and reactive to light.   Cardiovascular:      Rate and Rhythm: Normal rate and regular rhythm.      Pulses: Normal pulses.      Heart sounds: Normal heart sounds. No murmur heard.  Pulmonary:      Effort: Pulmonary effort is normal. No respiratory distress.      Breath sounds: Normal breath sounds. No stridor.   Abdominal:      General: Bowel sounds are normal. There is no distension.      Palpations: Abdomen is soft.      Tenderness: There is no abdominal tenderness.   Musculoskeletal:         General: No swelling or tenderness. Normal range of motion.       Cervical back: Normal range of motion. No rigidity.   Skin:     General: Skin is warm.      Capillary Refill: Capillary refill takes less than 2 seconds.      Coloration: Skin is not jaundiced or pale.      Findings: No bruising.   Neurological:      General: No focal deficit present.      Mental Status: She is alert and oriented to person, place, and time.      Cranial Nerves: No cranial nerve deficit.   Psychiatric:         Mood and Affect: Mood normal.         Behavior: Behavior normal.       Fluids  No intake or output data in the 24 hours ending 04/16/23 1706      Laboratory  Recent Labs     04/14/23  1151 04/15/23  0200 04/16/23  0236   WBC 6.9 15.1* 17.0*   RBC 5.23 4.36 4.54   HEMOGLOBIN 15.3 12.8 13.3   HEMATOCRIT 45.8 38.6 40.6   MCV 87.6 88.5 89.4   MCH 29.3 29.4 29.3   MCHC 33.4 33.2* 32.8*   RDW 12.5 42.5 44.2   PLATELETCT 167 149* 142*   MPV 9.6 10.1 10.2       Recent Labs     04/14/23  1151 04/15/23  0336 04/16/23  0236   SODIUM 138 137 134*   POTASSIUM 3.2* 5.1 4.1   CHLORIDE 100 106 105   CO2 24 21 18*   GLUCOSE 192* 130* 95   BUN 18 11 10   CREATININE 1.0 1.01 0.71   CALCIUM 8.8 7.4* 7.4*                     Imaging  EC-ECHOCARDIOGRAM COMPLETE W/O CONT   Final Result      OUTSIDE IMAGES-US ABDOMEN   Final Result      OUTSIDE IMAGES-CT ABDOMEN /PELVIS   Final Result      OUTSIDE IMAGES-DX CHEST   Final Result      [id number]    (Results Pending)          Assessment/Plan  * Acute gallstone pancreatitis  Assessment & Plan  Suspected  Elevated lipase  Cholecystitis and pancreatitis noted on CTAP  IVF  PPI  Continue to provide her pain control with IV Dilaudid every 3 hourly on April 16, 2023.  General surgery evaluated her and recommended that patient will get cholecystectomy prior to discharge.    Alcohol dependence (HCC)  Assessment & Plan  Multivitamins  I am continuing CIWA protocol with Ativan for alcohol withdrawal.  She does not show signs of alcohol withdrawal I discontinued CIWA  protocol on April 16, 2023.    Hyperglycemia  Assessment & Plan  Continue insulin sliding scale with hypoglycemia protocol.    Hypomagnesemia  Assessment & Plan  Continue to monitor and replace as needed.  I ordered magnesium level for tomorrow morning.    Hypokalemia  Assessment & Plan  Continue to monitor and replace as needed.  I ordered CMP for tomorrow morning April 17, 2023.    ACP (advance care planning)  Assessment & Plan  Admitting hospitalist discussed with patient in IMCU.  Patient is agreeable for invasive (CPR/defibrillation/intubation or mechanical ventilation) and noninvasive medical management.  Additionally, she is agreeable for IVF, antibiotic, possible ERCP and/or surgical intervention. FULL code status confirmed.  ACP: 17mins    Lactic acidosis  Assessment & Plan  Likely secondary to severe sepsis  IVF  Antibiotic  Now resolved.    Severe sepsis (HCC)  Assessment & Plan  This is Severe Sepsis Present on admission  SIRS criteria identified on my evaluation include: Tachycardia, with heart rate greater than 90 BPM and Tachypnea, with respirations greater than 20 per minute  Clinical indicators of end organ dysfunction include Lactate >2 mmol/L (18.0 mg/dL)  Source is GI  Sepsis protocol initiated  Crystalloid Fluid Administration: Fluid resuscitation ordered per standard protocol - 30 mL/kg per current or ideal body weight  IV antibiotics as appropriate for source of sepsis  Reassessment: I have reassessed the patient's hemodynamic status    Change antibiotic to IV ceftriaxone and metronidazole.  Her white blood cell count is trending up.  Follow culture results.      Choledocholithiasis with acute cholecystitis  Assessment & Plan  Suspected  [place] [facility] Mild circumferential gallbladder wall thickening and mild peripancreatic inflammatory changes are concerning for cholecystitis and pancreatitis.  Stone in the pancreatic duct with dilatation of more proximal pancreatic duct.    Continue IV  fluid.  Continue to provide pain control.  I changed antibiotic to IV ceftriaxone and IV Flagyl.  Surgery is following that.  MRCP ordered currently its pending.  Plan is to advance diet as tolerated.    Cholecystitis  Assessment & Plan  Noted on CTAP  IVF  Antibiotic  Surgery evaluated her and made recommendations.    Schwannoma of cranial nerve (HCC)- (present on admission)  Assessment & Plan  Per history   outpatient follow-up    Class 1 obesity in adult  Assessment & Plan  Diet and lifestyle modification  Body mass index is 34.1 kg/m².      Gastroesophageal reflux disease without esophagitis- (present on admission)  Assessment & Plan  Continue IV pantoprazole    History of breast cancer- (present on admission)  Assessment & Plan  History of  Treated with surgery, chemoradiation and subsequent bilateral oophorectomy    Hypothyroidism- (present on admission)  Assessment & Plan  Continue outpatient Synthroid    Dyslipidemia- (present on admission)  Assessment & Plan  Continue outpatient statin    Essential hypertension- (present on admission)  Assessment & Plan  Continue losartan 50 mg         I discussed plan of care during multidisciplinary rounds regarding patient's current medical condition and plan of care.    I discussed plan of care with patient and answered all her questions.      VTE prophylaxis: enoxaparin ppx    I have performed a physical exam and reviewed and updated ROS and Plan today (4/16/2023). In review of yesterday's note (4/15/2023), there are no changes except as documented above.

## 2023-04-17 NOTE — PROGRESS NOTES
DATE: 4/17/2023 4/14 Surgical consultation for evaluation of gallstone pancreatitis.     INTERVAL EVENTS:    Sitting up eating breakfast.   No abdominal pain on clinical exam.     - Resolving gallstone pancreatitis with no evidence of current biliary obstruction.    - MRCP pending.   - Laparoscopic cholecystectomy prior to discharge.    REVIEW OF SYSTEMS:    No abdominal pain. No N/V.     PHYSICAL EXAMINATION:    Vital Signs: BP (!) 141/87   Pulse 89   Temp 36.6 °C (97.9 °F) (Temporal)   Resp 16   Ht 1.524 m (5')   Wt 76.9 kg (169 lb 8.5 oz)   SpO2 96%     Constitutional: No acute distress. Sitting up eating breakfast.   Cardiac: Regular rate.  Pulmonary: Unlabored.   Abdomen: Benign abdominal exam.  Tolerating oral diet.  Neuro: Conversant.     LABORATORY VALUES:   Recent Labs     04/15/23  0200 04/16/23  0236 04/17/23  0553   WBC 15.1* 17.0* 11.8*   RBC 4.36 4.54 4.49   HEMOGLOBIN 12.8 13.3 13.1   HEMATOCRIT 38.6 40.6 38.0   MCV 88.5 89.4 84.6   MCH 29.4 29.3 29.2   MCHC 33.2* 32.8* 34.5   RDW 42.5 44.2 39.8   PLATELETCT 149* 142* 126*   MPV 10.1 10.2 10.4     Recent Labs     04/15/23  0336 04/16/23  0236 04/17/23  0553   SODIUM 137 134* 132*   POTASSIUM 5.1 4.1 3.6   CHLORIDE 106 105 99   CO2 21 18* 20   GLUCOSE 130* 95 85   BUN 11 10 10   CREATININE 1.01 0.71 0.63   CALCIUM 7.4* 7.4* 7.6*     Recent Labs     04/15/23  0200 04/15/23  0336 04/16/23  0236 04/17/23  0553   ASTSGOT  --  93* 66* 34   ALTSGPT  --  69* 65* 40   TBILIRUBIN  --  0.9 0.8 0.7   IBILIRUBIN 0.4  --   --   --    DBILIRUBIN 0.3  --   --   --    ALKPHOSPHAT  --  61 69 84   GLOBULIN  --  2.1 2.5 2.3            IMAGING:   EC-ECHOCARDIOGRAM COMPLETE W/O CONT   Final Result      OUTSIDE IMAGES-US ABDOMEN   Final Result      OUTSIDE IMAGES-CT ABDOMEN /PELVIS   Final Result      OUTSIDE IMAGES-DX CHEST   Final Result      [id number]    (Results Pending)          ____________________________________     REGI Marsh    DD:  4/17/2023  10:20 AM

## 2023-04-17 NOTE — PROGRESS NOTES
"Report received from AM RN; assumed care. Assessment completed. Patient hard of hearing. Assessment completed. A&O x 3, disoriented to time. Forgetfulness noted. Anxiety waxed/waned over shift. Therapeutic listening provided. VSS, intermittent HTN. Intermittent desaturation noted, lowest 87%.  93-97 on 1-2L NC. Patient denying SOB, numbness, tingling, nausea, vomiting during assessment. Patient reported having RLS where she stated uses Lidocaine cream for BLE. Intermittent dizziness reported with ambulating back from bathroom. Patient denied medication needs for pain. X 2 episodes of dry heaving noted. Medicated for nausea; see MAR. Abdomen round, tender. Hypoactive BS X 4  noted. + void; yellow urine noted. Patient voiding Q90 minutes. + eructation. + flatus. LBM 04/17, per patient report diarrhea had slowed down. Patient tolerating CHO diet, noted. Crackers/diet juice noted on table. Patient up x 1 person with steady/unsteady gait noted, utilizing FWW for balance, slow gait noted. Limb swelling. BLANCO hose/tubi- stockings utilized; removed d/t feeling \"hot\". NOC hospitalists contacted frequently d/t \"jitteriness\" \"anxiety\". X 2 orders for Atarax/Ativan. Patient refused both medications stating it caused hallucinations. Patient uncertain which medication did. Discussed plan of care with patient. All questions answered.  High fall risk. Bed/strip alarm engaged. Bed in locked/lowest position.  Call light/personal belongings within reach.  All needs met, patient slept intermittently during shift.    "

## 2023-04-18 ENCOUNTER — ANESTHESIA (OUTPATIENT)
Dept: SURGERY | Facility: MEDICAL CENTER | Age: 73
DRG: 853 | End: 2023-04-18
Payer: MEDICARE

## 2023-04-18 ENCOUNTER — ANESTHESIA EVENT (OUTPATIENT)
Dept: SURGERY | Facility: MEDICAL CENTER | Age: 73
DRG: 853 | End: 2023-04-18
Payer: MEDICARE

## 2023-04-18 LAB
ALBUMIN SERPL BCP-MCNC: 3.6 G/DL (ref 3.2–4.9)
ALBUMIN/GLOB SERPL: 1.4 G/DL
ALP SERPL-CCNC: 95 U/L (ref 30–99)
ALT SERPL-CCNC: 32 U/L (ref 2–50)
ANION GAP SERPL CALC-SCNC: 15 MMOL/L (ref 7–16)
AST SERPL-CCNC: 28 U/L (ref 12–45)
BILIRUB SERPL-MCNC: 0.4 MG/DL (ref 0.1–1.5)
BUN SERPL-MCNC: 11 MG/DL (ref 8–22)
CALCIUM ALBUM COR SERPL-MCNC: 7.8 MG/DL (ref 8.5–10.5)
CALCIUM SERPL-MCNC: 7.5 MG/DL (ref 8.5–10.5)
CHLORIDE SERPL-SCNC: 96 MMOL/L (ref 96–112)
CO2 SERPL-SCNC: 20 MMOL/L (ref 20–33)
CREAT SERPL-MCNC: 0.66 MG/DL (ref 0.5–1.4)
ERYTHROCYTE [DISTWIDTH] IN BLOOD BY AUTOMATED COUNT: 39.3 FL (ref 35.9–50)
GFR SERPLBLD CREATININE-BSD FMLA CKD-EPI: 92 ML/MIN/1.73 M 2
GLOBULIN SER CALC-MCNC: 2.6 G/DL (ref 1.9–3.5)
GLUCOSE BLD STRIP.AUTO-MCNC: 106 MG/DL (ref 65–99)
GLUCOSE BLD STRIP.AUTO-MCNC: 77 MG/DL (ref 65–99)
GLUCOSE BLD STRIP.AUTO-MCNC: 86 MG/DL (ref 65–99)
GLUCOSE BLD STRIP.AUTO-MCNC: 87 MG/DL (ref 65–99)
GLUCOSE SERPL-MCNC: 74 MG/DL (ref 65–99)
HCT VFR BLD AUTO: 39.2 % (ref 37–47)
HGB BLD-MCNC: 13.5 G/DL (ref 12–16)
MCH RBC QN AUTO: 29.2 PG (ref 27–33)
MCHC RBC AUTO-ENTMCNC: 34.4 G/DL (ref 33.6–35)
MCV RBC AUTO: 84.8 FL (ref 81.4–97.8)
PLATELET # BLD AUTO: 132 K/UL (ref 164–446)
PMV BLD AUTO: 10.2 FL (ref 9–12.9)
POTASSIUM SERPL-SCNC: 3.6 MMOL/L (ref 3.6–5.5)
PROT SERPL-MCNC: 6.2 G/DL (ref 6–8.2)
RBC # BLD AUTO: 4.62 M/UL (ref 4.2–5.4)
SODIUM SERPL-SCNC: 131 MMOL/L (ref 135–145)
WBC # BLD AUTO: 8.7 K/UL (ref 4.8–10.8)

## 2023-04-18 PROCEDURE — 700102 HCHG RX REV CODE 250 W/ 637 OVERRIDE(OP): Performed by: STUDENT IN AN ORGANIZED HEALTH CARE EDUCATION/TRAINING PROGRAM

## 2023-04-18 PROCEDURE — 80053 COMPREHEN METABOLIC PANEL: CPT

## 2023-04-18 PROCEDURE — 160041 HCHG SURGERY MINUTES - EA ADDL 1 MIN LEVEL 4: Performed by: SURGERY

## 2023-04-18 PROCEDURE — 700105 HCHG RX REV CODE 258: Performed by: ANESTHESIOLOGY

## 2023-04-18 PROCEDURE — 00790 ANES IPER UPR ABD NOS: CPT | Performed by: ANESTHESIOLOGY

## 2023-04-18 PROCEDURE — 88304 TISSUE EXAM BY PATHOLOGIST: CPT

## 2023-04-18 PROCEDURE — 700111 HCHG RX REV CODE 636 W/ 250 OVERRIDE (IP): Performed by: INTERNAL MEDICINE

## 2023-04-18 PROCEDURE — 160009 HCHG ANES TIME/MIN: Performed by: SURGERY

## 2023-04-18 PROCEDURE — 700101 HCHG RX REV CODE 250: Performed by: ANESTHESIOLOGY

## 2023-04-18 PROCEDURE — 700101 HCHG RX REV CODE 250: Performed by: SURGERY

## 2023-04-18 PROCEDURE — 700111 HCHG RX REV CODE 636 W/ 250 OVERRIDE (IP): Performed by: STUDENT IN AN ORGANIZED HEALTH CARE EDUCATION/TRAINING PROGRAM

## 2023-04-18 PROCEDURE — 160029 HCHG SURGERY MINUTES - 1ST 30 MINS LEVEL 4: Performed by: SURGERY

## 2023-04-18 PROCEDURE — 47562 LAPAROSCOPIC CHOLECYSTECTOMY: CPT | Mod: AS | Performed by: NURSE PRACTITIONER

## 2023-04-18 PROCEDURE — 36415 COLL VENOUS BLD VENIPUNCTURE: CPT

## 2023-04-18 PROCEDURE — 700102 HCHG RX REV CODE 250 W/ 637 OVERRIDE(OP): Performed by: INTERNAL MEDICINE

## 2023-04-18 PROCEDURE — 160048 HCHG OR STATISTICAL LEVEL 1-5: Performed by: SURGERY

## 2023-04-18 PROCEDURE — 700111 HCHG RX REV CODE 636 W/ 250 OVERRIDE (IP)

## 2023-04-18 PROCEDURE — 47562 LAPAROSCOPIC CHOLECYSTECTOMY: CPT | Performed by: SURGERY

## 2023-04-18 PROCEDURE — 99233 SBSQ HOSP IP/OBS HIGH 50: CPT | Performed by: INTERNAL MEDICINE

## 2023-04-18 PROCEDURE — 85027 COMPLETE CBC AUTOMATED: CPT

## 2023-04-18 PROCEDURE — 700111 HCHG RX REV CODE 636 W/ 250 OVERRIDE (IP): Performed by: ANESTHESIOLOGY

## 2023-04-18 PROCEDURE — 160002 HCHG RECOVERY MINUTES (STAT): Performed by: SURGERY

## 2023-04-18 PROCEDURE — 0FT44ZZ RESECTION OF GALLBLADDER, PERCUTANEOUS ENDOSCOPIC APPROACH: ICD-10-PCS | Performed by: SURGERY

## 2023-04-18 PROCEDURE — 82962 GLUCOSE BLOOD TEST: CPT | Mod: 91

## 2023-04-18 PROCEDURE — 99100 ANES PT EXTEME AGE<1 YR&>70: CPT | Performed by: ANESTHESIOLOGY

## 2023-04-18 PROCEDURE — 770001 HCHG ROOM/CARE - MED/SURG/GYN PRIV*

## 2023-04-18 PROCEDURE — A9270 NON-COVERED ITEM OR SERVICE: HCPCS | Performed by: INTERNAL MEDICINE

## 2023-04-18 PROCEDURE — 160036 HCHG PACU - EA ADDL 30 MINS PHASE I: Performed by: SURGERY

## 2023-04-18 PROCEDURE — A9270 NON-COVERED ITEM OR SERVICE: HCPCS | Performed by: STUDENT IN AN ORGANIZED HEALTH CARE EDUCATION/TRAINING PROGRAM

## 2023-04-18 PROCEDURE — 160035 HCHG PACU - 1ST 60 MINS PHASE I: Performed by: SURGERY

## 2023-04-18 RX ORDER — HYDROMORPHONE HYDROCHLORIDE 1 MG/ML
0.2 INJECTION, SOLUTION INTRAMUSCULAR; INTRAVENOUS; SUBCUTANEOUS
Status: DISCONTINUED | OUTPATIENT
Start: 2023-04-18 | End: 2023-04-18 | Stop reason: HOSPADM

## 2023-04-18 RX ORDER — HALOPERIDOL 5 MG/ML
1 INJECTION INTRAMUSCULAR
Status: DISCONTINUED | OUTPATIENT
Start: 2023-04-18 | End: 2023-04-18 | Stop reason: HOSPADM

## 2023-04-18 RX ORDER — LABETALOL HYDROCHLORIDE 5 MG/ML
5 INJECTION, SOLUTION INTRAVENOUS
Status: DISCONTINUED | OUTPATIENT
Start: 2023-04-18 | End: 2023-04-18 | Stop reason: HOSPADM

## 2023-04-18 RX ORDER — HYDRALAZINE HYDROCHLORIDE 20 MG/ML
5 INJECTION INTRAMUSCULAR; INTRAVENOUS
Status: DISCONTINUED | OUTPATIENT
Start: 2023-04-18 | End: 2023-04-18 | Stop reason: HOSPADM

## 2023-04-18 RX ORDER — BUPIVACAINE HYDROCHLORIDE AND EPINEPHRINE 5; 5 MG/ML; UG/ML
INJECTION, SOLUTION EPIDURAL; INTRACAUDAL; PERINEURAL
Status: DISCONTINUED | OUTPATIENT
Start: 2023-04-18 | End: 2023-04-18 | Stop reason: HOSPADM

## 2023-04-18 RX ORDER — CEFAZOLIN SODIUM 1 G/3ML
INJECTION, POWDER, FOR SOLUTION INTRAMUSCULAR; INTRAVENOUS PRN
Status: DISCONTINUED | OUTPATIENT
Start: 2023-04-18 | End: 2023-04-18 | Stop reason: SURG

## 2023-04-18 RX ORDER — OXYCODONE HCL 5 MG/5 ML
5 SOLUTION, ORAL ORAL
Status: DISCONTINUED | OUTPATIENT
Start: 2023-04-18 | End: 2023-04-18 | Stop reason: HOSPADM

## 2023-04-18 RX ORDER — KETOROLAC TROMETHAMINE 30 MG/ML
INJECTION, SOLUTION INTRAMUSCULAR; INTRAVENOUS PRN
Status: DISCONTINUED | OUTPATIENT
Start: 2023-04-18 | End: 2023-04-18 | Stop reason: SURG

## 2023-04-18 RX ORDER — ONDANSETRON 2 MG/ML
INJECTION INTRAMUSCULAR; INTRAVENOUS PRN
Status: DISCONTINUED | OUTPATIENT
Start: 2023-04-18 | End: 2023-04-18 | Stop reason: SURG

## 2023-04-18 RX ORDER — HYDROMORPHONE HYDROCHLORIDE 1 MG/ML
0.4 INJECTION, SOLUTION INTRAMUSCULAR; INTRAVENOUS; SUBCUTANEOUS
Status: DISCONTINUED | OUTPATIENT
Start: 2023-04-18 | End: 2023-04-18 | Stop reason: HOSPADM

## 2023-04-18 RX ORDER — SODIUM CHLORIDE, SODIUM LACTATE, POTASSIUM CHLORIDE, CALCIUM CHLORIDE 600; 310; 30; 20 MG/100ML; MG/100ML; MG/100ML; MG/100ML
INJECTION, SOLUTION INTRAVENOUS
Status: DISCONTINUED | OUTPATIENT
Start: 2023-04-18 | End: 2023-04-18 | Stop reason: SURG

## 2023-04-18 RX ORDER — OXYCODONE HCL 5 MG/5 ML
10 SOLUTION, ORAL ORAL
Status: DISCONTINUED | OUTPATIENT
Start: 2023-04-18 | End: 2023-04-18 | Stop reason: HOSPADM

## 2023-04-18 RX ORDER — IPRATROPIUM BROMIDE AND ALBUTEROL SULFATE 2.5; .5 MG/3ML; MG/3ML
3 SOLUTION RESPIRATORY (INHALATION)
Status: DISCONTINUED | OUTPATIENT
Start: 2023-04-18 | End: 2023-04-18 | Stop reason: HOSPADM

## 2023-04-18 RX ORDER — MIDAZOLAM HYDROCHLORIDE 1 MG/ML
1 INJECTION INTRAMUSCULAR; INTRAVENOUS
Status: DISCONTINUED | OUTPATIENT
Start: 2023-04-18 | End: 2023-04-18 | Stop reason: HOSPADM

## 2023-04-18 RX ORDER — MEPERIDINE HYDROCHLORIDE 25 MG/ML
12.5 INJECTION INTRAMUSCULAR; INTRAVENOUS; SUBCUTANEOUS
Status: DISCONTINUED | OUTPATIENT
Start: 2023-04-18 | End: 2023-04-18 | Stop reason: HOSPADM

## 2023-04-18 RX ORDER — SODIUM CHLORIDE, SODIUM LACTATE, POTASSIUM CHLORIDE, CALCIUM CHLORIDE 600; 310; 30; 20 MG/100ML; MG/100ML; MG/100ML; MG/100ML
INJECTION, SOLUTION INTRAVENOUS CONTINUOUS
Status: DISCONTINUED | OUTPATIENT
Start: 2023-04-18 | End: 2023-04-18 | Stop reason: HOSPADM

## 2023-04-18 RX ORDER — DEXAMETHASONE SODIUM PHOSPHATE 4 MG/ML
INJECTION, SOLUTION INTRA-ARTICULAR; INTRALESIONAL; INTRAMUSCULAR; INTRAVENOUS; SOFT TISSUE PRN
Status: DISCONTINUED | OUTPATIENT
Start: 2023-04-18 | End: 2023-04-18 | Stop reason: SURG

## 2023-04-18 RX ORDER — LIDOCAINE HYDROCHLORIDE 20 MG/ML
INJECTION, SOLUTION EPIDURAL; INFILTRATION; INTRACAUDAL; PERINEURAL PRN
Status: DISCONTINUED | OUTPATIENT
Start: 2023-04-18 | End: 2023-04-18 | Stop reason: SURG

## 2023-04-18 RX ORDER — EPHEDRINE SULFATE 50 MG/ML
INJECTION, SOLUTION INTRAVENOUS PRN
Status: DISCONTINUED | OUTPATIENT
Start: 2023-04-18 | End: 2023-04-18 | Stop reason: SURG

## 2023-04-18 RX ORDER — HYDROMORPHONE HYDROCHLORIDE 1 MG/ML
0.1 INJECTION, SOLUTION INTRAMUSCULAR; INTRAVENOUS; SUBCUTANEOUS
Status: DISCONTINUED | OUTPATIENT
Start: 2023-04-18 | End: 2023-04-18 | Stop reason: HOSPADM

## 2023-04-18 RX ORDER — ONDANSETRON 2 MG/ML
4 INJECTION INTRAMUSCULAR; INTRAVENOUS
Status: DISCONTINUED | OUTPATIENT
Start: 2023-04-18 | End: 2023-04-18 | Stop reason: HOSPADM

## 2023-04-18 RX ORDER — PHENYLEPHRINE HCL IN 0.9% NACL 0.5 MG/5ML
SYRINGE (ML) INTRAVENOUS PRN
Status: DISCONTINUED | OUTPATIENT
Start: 2023-04-18 | End: 2023-04-18 | Stop reason: SURG

## 2023-04-18 RX ADMIN — PROPOFOL 20 MG: 10 INJECTION, EMULSION INTRAVENOUS at 18:15

## 2023-04-18 RX ADMIN — ONDANSETRON 4 MG: 2 INJECTION INTRAMUSCULAR; INTRAVENOUS at 17:19

## 2023-04-18 RX ADMIN — HYDROMORPHONE HYDROCHLORIDE 0.2 MG: 1 INJECTION, SOLUTION INTRAMUSCULAR; INTRAVENOUS; SUBCUTANEOUS at 19:25

## 2023-04-18 RX ADMIN — HYDROMORPHONE HYDROCHLORIDE 0.2 MG: 1 INJECTION, SOLUTION INTRAMUSCULAR; INTRAVENOUS; SUBCUTANEOUS at 19:31

## 2023-04-18 RX ADMIN — SODIUM CHLORIDE, POTASSIUM CHLORIDE, SODIUM LACTATE AND CALCIUM CHLORIDE: 600; 310; 30; 20 INJECTION, SOLUTION INTRAVENOUS at 17:17

## 2023-04-18 RX ADMIN — FENTANYL CITRATE 50 MCG: 50 INJECTION, SOLUTION INTRAMUSCULAR; INTRAVENOUS at 18:54

## 2023-04-18 RX ADMIN — FENTANYL CITRATE 50 MCG: 50 INJECTION, SOLUTION INTRAMUSCULAR; INTRAVENOUS at 19:03

## 2023-04-18 RX ADMIN — FOLIC ACID 1 MG: 1 TABLET ORAL at 08:36

## 2023-04-18 RX ADMIN — METRONIDAZOLE 500 MG: 500 TABLET ORAL at 22:11

## 2023-04-18 RX ADMIN — ONDANSETRON HYDROCHLORIDE 4 MG: 2 SOLUTION INTRAMUSCULAR; INTRAVENOUS at 22:08

## 2023-04-18 RX ADMIN — Medication 200 MCG: at 17:39

## 2023-04-18 RX ADMIN — LEVOTHYROXINE SODIUM 112 MCG: 0.11 TABLET ORAL at 06:50

## 2023-04-18 RX ADMIN — ONDANSETRON HYDROCHLORIDE 4 MG: 2 SOLUTION INTRAMUSCULAR; INTRAVENOUS at 10:45

## 2023-04-18 RX ADMIN — DEXAMETHASONE SODIUM PHOSPHATE 8 MG: 4 INJECTION, SOLUTION INTRA-ARTICULAR; INTRALESIONAL; INTRAMUSCULAR; INTRAVENOUS; SOFT TISSUE at 17:19

## 2023-04-18 RX ADMIN — ROCURONIUM BROMIDE 50 MG: 10 INJECTION, SOLUTION INTRAVENOUS at 17:19

## 2023-04-18 RX ADMIN — ONDANSETRON HYDROCHLORIDE 4 MG: 2 SOLUTION INTRAMUSCULAR; INTRAVENOUS at 06:48

## 2023-04-18 RX ADMIN — Medication 100 MG: at 08:36

## 2023-04-18 RX ADMIN — METRONIDAZOLE 500 MG: 500 TABLET ORAL at 06:50

## 2023-04-18 RX ADMIN — HYDROMORPHONE HYDROCHLORIDE 0.2 MG: 1 INJECTION, SOLUTION INTRAMUSCULAR; INTRAVENOUS; SUBCUTANEOUS at 19:36

## 2023-04-18 RX ADMIN — PROPOFOL 100 MG: 10 INJECTION, EMULSION INTRAVENOUS at 17:49

## 2023-04-18 RX ADMIN — ONDANSETRON 4 MG: 4 TABLET, ORALLY DISINTEGRATING ORAL at 13:33

## 2023-04-18 RX ADMIN — CEFAZOLIN 2 G: 330 INJECTION, POWDER, FOR SOLUTION INTRAMUSCULAR; INTRAVENOUS at 17:22

## 2023-04-18 RX ADMIN — THERA TABS 1 TABLET: TAB at 08:36

## 2023-04-18 RX ADMIN — FENTANYL CITRATE 50 MCG: 50 INJECTION, SOLUTION INTRAMUSCULAR; INTRAVENOUS at 17:17

## 2023-04-18 RX ADMIN — HYDROMORPHONE HYDROCHLORIDE 0.2 MG: 1 INJECTION, SOLUTION INTRAMUSCULAR; INTRAVENOUS; SUBCUTANEOUS at 19:15

## 2023-04-18 RX ADMIN — METRONIDAZOLE 500 MG: 500 TABLET ORAL at 13:34

## 2023-04-18 RX ADMIN — LIDOCAINE HYDROCHLORIDE 100 MG: 20 INJECTION, SOLUTION EPIDURAL; INFILTRATION; INTRACAUDAL at 17:19

## 2023-04-18 RX ADMIN — FAMOTIDINE 20 MG: 20 TABLET, FILM COATED ORAL at 06:50

## 2023-04-18 RX ADMIN — KETOROLAC TROMETHAMINE 15 MG: 30 INJECTION, SOLUTION INTRAMUSCULAR at 18:15

## 2023-04-18 RX ADMIN — PROPOFOL 150 MG: 10 INJECTION, EMULSION INTRAVENOUS at 17:19

## 2023-04-18 RX ADMIN — HYDROMORPHONE HYDROCHLORIDE 0.2 MG: 1 INJECTION, SOLUTION INTRAMUSCULAR; INTRAVENOUS; SUBCUTANEOUS at 19:20

## 2023-04-18 RX ADMIN — SUGAMMADEX 200 MG: 100 INJECTION, SOLUTION INTRAVENOUS at 18:15

## 2023-04-18 RX ADMIN — CEFTRIAXONE SODIUM 1000 MG: 10 INJECTION, POWDER, FOR SOLUTION INTRAVENOUS at 13:34

## 2023-04-18 RX ADMIN — FENTANYL CITRATE 50 MCG: 50 INJECTION, SOLUTION INTRAMUSCULAR; INTRAVENOUS at 17:27

## 2023-04-18 RX ADMIN — EPHEDRINE SULFATE 10 MG: 50 INJECTION, SOLUTION INTRAVENOUS at 17:41

## 2023-04-18 RX ADMIN — HALOPERIDOL LACTATE 1 MG: 5 INJECTION, SOLUTION INTRAMUSCULAR at 19:06

## 2023-04-18 RX ADMIN — LOSARTAN POTASSIUM 50 MG: 50 TABLET, FILM COATED ORAL at 06:50

## 2023-04-18 ASSESSMENT — PAIN DESCRIPTION - PAIN TYPE
TYPE: ACUTE PAIN
TYPE: ACUTE PAIN
TYPE: SURGICAL PAIN
TYPE: ACUTE PAIN;SURGICAL PAIN
TYPE: SURGICAL PAIN
TYPE: ACUTE PAIN

## 2023-04-18 ASSESSMENT — ENCOUNTER SYMPTOMS
MYALGIAS: 0
NERVOUS/ANXIOUS: 1
WEAKNESS: 0
NAUSEA: 1
ABDOMINAL PAIN: 0
DIARRHEA: 1
SHORTNESS OF BREATH: 0

## 2023-04-18 ASSESSMENT — PAIN SCALES - GENERAL: PAIN_LEVEL: 0

## 2023-04-18 NOTE — ANESTHESIA PREPROCEDURE EVALUATION
Case: [id number] Date/Time: 04/18/23 1344    Procedure: CHOLECYSTECTOMY, LAPAROSCOPIC    Location: [facility] OR 10 / SURGERY [facility]    Surgeons: Madhu Walton M.D.      Acute cholecystitis.     Denies angina, dyspnea.     PONV, slow emergence in past per patient.     NPO.     Relevant Problems   CARDIAC   (positive) Essential hypertension   (positive) Left carotid artery occlusion   (positive) Vertebral artery obstruction      GI   (positive) Gastroesophageal reflux disease without esophagitis      ENDO   (positive) Hypothyroidism       Physical Exam    Airway   Mallampati: III  TM distance: >3 FB  Neck ROM: full       Cardiovascular - normal exam  Rhythm: regular  Rate: normal  (-) murmur     Dental - normal exam           Pulmonary - normal exam  Breath sounds clear to auscultation     Abdominal    Neurological - normal exam                 Anesthesia Plan    ASA 3   ASA physical status 3 criteria: hypertension - poorly controlled    Plan - general       Airway plan will be ETT          Induction: intravenous    Postoperative Plan: Postoperative administration of opioids is intended.    Pertinent diagnostic labs and testing reviewed    Informed Consent:    Anesthetic plan and risks discussed with patient.    Use of blood products discussed with: patient whom consented to blood products.

## 2023-04-18 NOTE — OR NURSING
Pre-op assessment complete, vital signs stable, and pt updated on the plan of care. Call light within reach. No further needs at this time.

## 2023-04-18 NOTE — PROGRESS NOTES
Report received from AM RN; assumed care. Assessment complete. Patient hard of hearing. A&O x 4 this shift. Mild forgetfulness noted. Patient expressed anxious about being hospitalized/surgery. Education provided, calmed. VSS, intermittent HTN. 1L NC placed d/t intermittent dip in saturations. Patient denying SOB, numbness, tingling, nausea, vomiting, numbness, tingling, pain. Discomfort reported to mid/upper abdominal region. Pre-medicated for nausea; see MAR, d/t reports Flagyl makes her nauseous. Abdomen round, tender. Hypoactive BS X 4  noted. + void, patient continues to void every 90 minutes. + eructation. + flatus. LBM 04/18, multiple BMs reported during AM shift. Patient reported not being able to eat much during AM shift, lunch was left untouched. 30% dinner ingested before NPO at 000. Patient up x 1 person with steady/slow gait noted, utilizing FWW for balance, slow gait noted. Ellen cream/perineal wipes/mepilex in place to sacrum to assisted with redness. Tubi- stockings in place to assist with BLE swelling. Patient tolerated SCDs for 1 hour. Discussed plan of care/surgery with patient. All questions answered. Moderate fall risk. Bed/strip alarm engaged. Bed in locked/lowest position.  Call light/personal belongings within reach.  All needs met, patient sleeping at present time.

## 2023-04-18 NOTE — CARE PLAN
The patient is Stable - Low risk of patient condition declining or worsening    Shift Goals  Clinical Goals: Pain control, mobility, pain/nausea control, rest  Patient Goals: Pain control, rest  Family Goals: support patient, sister called    Progress made toward(s) clinical / shift goals:  Patient reported discomfort, not pain over shift. Pre-medicated with Zofran prior to Flagyl administration d/t patient reports of nausea, prior NOC shift dry heaving after ingesting medication. Patient up frequently to void. Patient slept intermittently during shift. Education provided regarding surgery expectations, verbalized understanding.     Patient is not progressing towards the following goals: NA.

## 2023-04-18 NOTE — CARE PLAN
The patient is Stable - Low risk of patient condition declining or worsening    Shift Goals  Clinical Goals: pain control, nausea control, surgery  Patient Goals: Pain control, rest  Family Goals: support patient, sister called    Progress made toward(s) clinical / shift goals:  PRN medication for nausea and pain administered. Educated patient on plan for surgery.      Patient is not progressing towards the following goals: N/A        Problem: Knowledge Deficit - Standard  Goal: Patient and family/care givers will demonstrate understanding of plan of care, disease process/condition, diagnostic tests and medications  Outcome: Met

## 2023-04-18 NOTE — PROGRESS NOTES
Bedside report received. Assessment complete.  A&O x 4, forgetful with time, [place]. Patient calls appropriately. Slightly anxious due to scheduled surgery, education provided.    Patient ambulates with SBA with FWW with slow steady gait. Patient declines pain at this time. Comfort measures provided. Patient complains of nausea, PRN  medications provided relief. Patient on NPO for surgery.    + void, + flatus, Last BM 04/18.    Patient on room air, denies SOB.  SCD's refused.    Patient is pleasant and cooperative with  plan of care.  Review plan with of care with patient. Call light and personal belongings within reach. Hourly rounding in place. All needs met at this time.

## 2023-04-18 NOTE — PROGRESS NOTES
Hospital Medicine Daily Progress Note    Date of Service  4/18/2023    Chief Complaint  Audrey Delgadillo is a 72 y.o. female admitted 4/14/2023 with abd pain    Hospital Course  71 yo woman with alcohol use, HTN, HLD, GERD, hypothyroidism who had sudden abd pain and CT AP showed cholecystitis and pancreatitis. She was transferred from [facility] [place] for gallstone pancreatitis and severe sepsis. Surgery was consulted    Interval Problem Update  She is hypertensive, denies abdominal pain, just feels uncomfortable  Plan for surgery today    I have discussed this patient's plan of care and discharge plan at IDT rounds today with Case Management, Nursing, Nursing leadership, and other members of the IDT team.    Consultants/Specialty  general surgery    Code Status  Full Code    Disposition  Patient is not medically cleared for discharge.   Anticipate discharge to to home with close outpatient follow-up.  I have placed the appropriate orders for post-discharge needs.    Review of Systems  Review of Systems   Constitutional:  Negative for malaise/fatigue.   Respiratory:  Negative for shortness of breath.    Cardiovascular:  Negative for chest pain.   Gastrointestinal:  Positive for diarrhea and nausea. Negative for abdominal pain.   Musculoskeletal:  Negative for myalgias.   Neurological:  Negative for weakness.   Psychiatric/Behavioral:  The patient is nervous/anxious.       Physical Exam  Temp:  [35.8 °C (96.4 °F)-37.7 °C (99.9 °F)] 35.8 °C (96.4 °F)  Pulse:  [69-94] 79  Resp:  [16-18] 18  BP: ()/(41-99) 160/77  SpO2:  [89 %-97 %] 95 %    Physical Exam  Vitals and nursing note reviewed.   Constitutional:       General: She is not in acute distress.     Appearance: She is not toxic-appearing.   HENT:      Head: Normocephalic.      Mouth/Throat:      Mouth: Mucous membranes are moist.   Eyes:      General:         Right eye: No discharge.         Left eye: No discharge.   Cardiovascular:      Rate and Rhythm: Normal rate and  regular rhythm.   Pulmonary:      Effort: Pulmonary effort is normal. No respiratory distress.      Breath sounds: No wheezing or rales.   Abdominal:      Palpations: Abdomen is soft.      Tenderness: There is no abdominal tenderness. There is no guarding or rebound.   Musculoskeletal:      Cervical back: Neck supple.      Right lower leg: Edema present.      Left lower leg: Edema present.   Skin:     General: Skin is warm and dry.   Neurological:      Mental Status: She is alert and oriented to person, place, and time.       Fluids  No intake or output data in the 24 hours ending 04/18/23 1743      Laboratory  Recent Labs     04/16/23  0236 04/17/23  0553 04/18/23  0042   WBC 17.0* 11.8* 8.7   RBC 4.54 4.49 4.62   HEMOGLOBIN 13.3 13.1 13.5   HEMATOCRIT 40.6 38.0 39.2   MCV 89.4 84.6 84.8   MCH 29.3 29.2 29.2   MCHC 32.8* 34.5 34.4   RDW 44.2 39.8 39.3   PLATELETCT 142* 126* 132*   MPV 10.2 10.4 10.2     Recent Labs     04/16/23  0236 04/17/23  0553 04/18/23  0042   SODIUM 134* 132* 131*   POTASSIUM 4.1 3.6 3.6   CHLORIDE 105 99 96   CO2 18* 20 20   GLUCOSE 95 85 74   BUN 10 10 11   CREATININE 0.71 0.63 0.66   CALCIUM 7.4* 7.6* 7.5*                   Imaging  EC-ECHOCARDIOGRAM COMPLETE W/O CONT   Final Result      OUTSIDE IMAGES-US ABDOMEN   Final Result      OUTSIDE IMAGES-CT ABDOMEN /PELVIS   Final Result      OUTSIDE IMAGES-DX CHEST   Final Result      HL-OKENQKC-C/O    (Results Pending)        Assessment/Plan  * Acute gallstone pancreatitis  Assessment & Plan  Suspected  Elevated lipase  Cholecystitis and pancreatitis noted on CTAP  MRCP has not been done  Plan for cholecystectomy with surgery today, n.p.o.    Thrombocytopenia (HCC)  Assessment & Plan  plt 126 today   No signs of bleeding, likely from acute infection  Continue to trend CBC    Alcohol dependence (HCC)  Assessment & Plan  Multivitamins  No signs of alcohol withdrawal    Hyperglycemia  Assessment & Plan  Continue insulin sliding scale with  hypoglycemia protocol.    Hypomagnesemia  Assessment & Plan  Recheck level tomorrow    Hypokalemia  Assessment & Plan  Recheck level tomorrow    ACP (advance care planning)  Assessment & Plan  Full code    Lactic acidosis  Assessment & Plan  Likely secondary to severe sepsis  IVF  Antibiotic  Now resolved.    Severe sepsis (HCC)  Assessment & Plan  This is Severe Sepsis Present on admission  SIRS criteria identified on my evaluation include: Tachycardia, with heart rate greater than 90 BPM and Tachypnea, with respirations greater than 20 per minute  Clinical indicators of end organ dysfunction include Lactate >2 mmol/L (18.0 mg/dL)  Source is GI  Sepsis protocol initiated  Crystalloid Fluid Administration: Fluid resuscitation ordered per standard protocol - 30 mL/kg per current or ideal body weight  IV antibiotics as appropriate for source of sepsis  Reassessment: I have reassessed the patient's hemodynamic status    Change antibiotic to IV ceftriaxone and metronidazole.  Culture- ntd     Choledocholithiasis with acute cholecystitis  Assessment & Plan  Suspected  [place] from [facility] [place]: Mild circumferential gallbladder wall thickening and mild peripancreatic inflammatory changes are concerning for cholecystitis and pancreatitis.  Stone in the pancreatic duct with dilatation of more proximal pancreatic duct.  Continue on ceftriaxone and Flagyl  Plan for surgery today  N.p.o.    Cholecystitis  Assessment & Plan  Noted on CTAP  Continue antibiotics and plan for cholecystectomy today    Schwannoma of cranial nerve (HCC)- (present on admission)  Assessment & Plan  Per history   outpatient follow-up    Class 1 obesity in adult  Assessment & Plan  Diet and lifestyle modification  Body mass index is 34.1 kg/m².      Gastroesophageal reflux disease without esophagitis- (present on admission)  Assessment & Plan  Continue IV pantoprazole    History of breast cancer- (present on admission)  Assessment & Plan  History of  Treated with  surgery, chemoradiation and subsequent bilateral oophorectomy    Hypothyroidism- (present on admission)  Assessment & Plan  Continue outpatient Synthroid    Dyslipidemia- (present on admission)  Assessment & Plan  Continue outpatient statin    Essential hypertension- (present on admission)  Assessment & Plan  Continue losartan 50 mg  Plan to increase Norvasc for uncontrolled hypertension         VTE prophylaxis: SCDs/TEDs    I have performed a physical exam and reviewed and updated ROS and Plan today (4/18/2023). In review of yesterday's note (4/17/2023), there are no changes except as documented above.

## 2023-04-19 LAB
ALBUMIN SERPL BCP-MCNC: 4.1 G/DL (ref 3.2–4.9)
ALBUMIN/GLOB SERPL: 1.6 G/DL
ALP SERPL-CCNC: 103 U/L (ref 30–99)
ALT SERPL-CCNC: 57 U/L (ref 2–50)
ANION GAP SERPL CALC-SCNC: 16 MMOL/L (ref 7–16)
AST SERPL-CCNC: 143 U/L (ref 12–45)
BASOPHILS # BLD AUTO: 1.7 % (ref 0–1.8)
BASOPHILS # BLD: 0.18 K/UL (ref 0–0.12)
BILIRUB SERPL-MCNC: 0.4 MG/DL (ref 0.1–1.5)
BUN SERPL-MCNC: 18 MG/DL (ref 8–22)
CALCIUM ALBUM COR SERPL-MCNC: 7.6 MG/DL (ref 8.5–10.5)
CALCIUM SERPL-MCNC: 7.7 MG/DL (ref 8.5–10.5)
CHLORIDE SERPL-SCNC: 94 MMOL/L (ref 96–112)
CO2 SERPL-SCNC: 18 MMOL/L (ref 20–33)
CREAT SERPL-MCNC: 0.77 MG/DL (ref 0.5–1.4)
EOSINOPHIL # BLD AUTO: 0 K/UL (ref 0–0.51)
EOSINOPHIL NFR BLD: 0 % (ref 0–6.9)
ERYTHROCYTE [DISTWIDTH] IN BLOOD BY AUTOMATED COUNT: 39.4 FL (ref 35.9–50)
GFR SERPLBLD CREATININE-BSD FMLA CKD-EPI: 81 ML/MIN/1.73 M 2
GLOBULIN SER CALC-MCNC: 2.5 G/DL (ref 1.9–3.5)
GLUCOSE BLD STRIP.AUTO-MCNC: 126 MG/DL (ref 65–99)
GLUCOSE BLD STRIP.AUTO-MCNC: 148 MG/DL (ref 65–99)
GLUCOSE BLD STRIP.AUTO-MCNC: 165 MG/DL (ref 65–99)
GLUCOSE BLD STRIP.AUTO-MCNC: 195 MG/DL (ref 65–99)
GLUCOSE SERPL-MCNC: 194 MG/DL (ref 65–99)
HCT VFR BLD AUTO: 43.6 % (ref 37–47)
HGB BLD-MCNC: 14.8 G/DL (ref 12–16)
LYMPHOCYTES # BLD AUTO: 0.8 K/UL (ref 1–4.8)
LYMPHOCYTES NFR BLD: 7.8 % (ref 22–41)
MANUAL DIFF BLD: NORMAL
MCH RBC QN AUTO: 29.1 PG (ref 27–33)
MCHC RBC AUTO-ENTMCNC: 33.9 G/DL (ref 33.6–35)
MCV RBC AUTO: 85.7 FL (ref 81.4–97.8)
MONOCYTES # BLD AUTO: 0.36 K/UL (ref 0–0.85)
MONOCYTES NFR BLD AUTO: 3.5 % (ref 0–13.4)
MORPHOLOGY BLD-IMP: NORMAL
NEUTROPHILS # BLD AUTO: 8.96 K/UL (ref 2–7.15)
NEUTROPHILS NFR BLD: 87 % (ref 44–72)
NRBC # BLD AUTO: 0 K/UL
NRBC BLD-RTO: 0 /100 WBC
PATHOLOGY CONSULT NOTE: NORMAL
PLATELET # BLD AUTO: 157 K/UL (ref 164–446)
PLATELET BLD QL SMEAR: NORMAL
PMV BLD AUTO: 11.4 FL (ref 9–12.9)
POTASSIUM SERPL-SCNC: 4.2 MMOL/L (ref 3.6–5.5)
PROT SERPL-MCNC: 6.6 G/DL (ref 6–8.2)
RBC # BLD AUTO: 5.09 M/UL (ref 4.2–5.4)
RBC BLD AUTO: NORMAL
SODIUM SERPL-SCNC: 128 MMOL/L (ref 135–145)
WBC # BLD AUTO: 10.3 K/UL (ref 4.8–10.8)

## 2023-04-19 PROCEDURE — 700102 HCHG RX REV CODE 250 W/ 637 OVERRIDE(OP): Performed by: STUDENT IN AN ORGANIZED HEALTH CARE EDUCATION/TRAINING PROGRAM

## 2023-04-19 PROCEDURE — 99024 POSTOP FOLLOW-UP VISIT: CPT | Performed by: SURGERY

## 2023-04-19 PROCEDURE — 82962 GLUCOSE BLOOD TEST: CPT | Mod: 91

## 2023-04-19 PROCEDURE — 700102 HCHG RX REV CODE 250 W/ 637 OVERRIDE(OP): Performed by: INTERNAL MEDICINE

## 2023-04-19 PROCEDURE — A9270 NON-COVERED ITEM OR SERVICE: HCPCS | Performed by: STUDENT IN AN ORGANIZED HEALTH CARE EDUCATION/TRAINING PROGRAM

## 2023-04-19 PROCEDURE — 700111 HCHG RX REV CODE 636 W/ 250 OVERRIDE (IP): Performed by: INTERNAL MEDICINE

## 2023-04-19 PROCEDURE — 85025 COMPLETE CBC W/AUTO DIFF WBC: CPT

## 2023-04-19 PROCEDURE — A9270 NON-COVERED ITEM OR SERVICE: HCPCS | Performed by: INTERNAL MEDICINE

## 2023-04-19 PROCEDURE — 700111 HCHG RX REV CODE 636 W/ 250 OVERRIDE (IP): Performed by: STUDENT IN AN ORGANIZED HEALTH CARE EDUCATION/TRAINING PROGRAM

## 2023-04-19 PROCEDURE — 700101 HCHG RX REV CODE 250: Performed by: INTERNAL MEDICINE

## 2023-04-19 PROCEDURE — 36415 COLL VENOUS BLD VENIPUNCTURE: CPT

## 2023-04-19 PROCEDURE — 80053 COMPREHEN METABOLIC PANEL: CPT

## 2023-04-19 PROCEDURE — 770001 HCHG ROOM/CARE - MED/SURG/GYN PRIV*

## 2023-04-19 PROCEDURE — 85007 BL SMEAR W/DIFF WBC COUNT: CPT

## 2023-04-19 PROCEDURE — 99232 SBSQ HOSP IP/OBS MODERATE 35: CPT | Performed by: INTERNAL MEDICINE

## 2023-04-19 RX ORDER — CEFDINIR 300 MG/1
300 CAPSULE ORAL 2 TIMES DAILY
Qty: 2 CAPSULE | Refills: 0 | Status: ACTIVE | OUTPATIENT
Start: 2023-04-19 | End: 2023-04-19 | Stop reason: SDUPTHER

## 2023-04-19 RX ORDER — METRONIDAZOLE 500 MG/1
500 TABLET ORAL EVERY 8 HOURS
Qty: 4 TABLET | Refills: 0 | Status: ACTIVE | OUTPATIENT
Start: 2023-04-19 | End: 2023-04-21

## 2023-04-19 RX ORDER — CEFDINIR 300 MG/1
300 CAPSULE ORAL 2 TIMES DAILY
Qty: 2 CAPSULE | Refills: 0 | Status: ACTIVE | OUTPATIENT
Start: 2023-04-19 | End: 2023-04-20

## 2023-04-19 RX ORDER — METRONIDAZOLE 500 MG/1
500 TABLET ORAL EVERY 8 HOURS
Qty: 4 TABLET | Refills: 0 | Status: ACTIVE | OUTPATIENT
Start: 2023-04-19 | End: 2023-04-19 | Stop reason: SDUPTHER

## 2023-04-19 RX ADMIN — FAMOTIDINE 20 MG: 20 TABLET, FILM COATED ORAL at 17:43

## 2023-04-19 RX ADMIN — AMLODIPINE BESYLATE 5 MG: 10 TABLET ORAL at 04:41

## 2023-04-19 RX ADMIN — LEVOTHYROXINE SODIUM 112 MCG: 0.11 TABLET ORAL at 04:41

## 2023-04-19 RX ADMIN — ONDANSETRON HYDROCHLORIDE 4 MG: 2 SOLUTION INTRAMUSCULAR; INTRAVENOUS at 09:26

## 2023-04-19 RX ADMIN — FAMOTIDINE 20 MG: 20 TABLET, FILM COATED ORAL at 04:41

## 2023-04-19 RX ADMIN — ENOXAPARIN SODIUM 40 MG: 40 INJECTION SUBCUTANEOUS at 17:43

## 2023-04-19 RX ADMIN — Medication 100 MG: at 04:41

## 2023-04-19 RX ADMIN — METRONIDAZOLE 500 MG: 500 TABLET ORAL at 17:43

## 2023-04-19 RX ADMIN — CEFTRIAXONE SODIUM 1000 MG: 10 INJECTION, POWDER, FOR SOLUTION INTRAVENOUS at 13:46

## 2023-04-19 RX ADMIN — LOSARTAN POTASSIUM 50 MG: 50 TABLET, FILM COATED ORAL at 04:41

## 2023-04-19 RX ADMIN — ONDANSETRON HYDROCHLORIDE 4 MG: 2 SOLUTION INTRAMUSCULAR; INTRAVENOUS at 17:45

## 2023-04-19 RX ADMIN — METRONIDAZOLE 500 MG: 500 TABLET ORAL at 09:18

## 2023-04-19 RX ADMIN — ONDANSETRON HYDROCHLORIDE 4 MG: 2 SOLUTION INTRAMUSCULAR; INTRAVENOUS at 03:47

## 2023-04-19 RX ADMIN — INSULIN HUMAN 1 UNITS: 100 INJECTION, SOLUTION PARENTERAL at 09:25

## 2023-04-19 RX ADMIN — INSULIN HUMAN 1 UNITS: 100 INJECTION, SOLUTION PARENTERAL at 13:43

## 2023-04-19 RX ADMIN — METRONIDAZOLE 500 MG: 500 TABLET ORAL at 20:39

## 2023-04-19 RX ADMIN — ATORVASTATIN CALCIUM 10 MG: 10 TABLET, FILM COATED ORAL at 17:43

## 2023-04-19 ASSESSMENT — PAIN DESCRIPTION - PAIN TYPE: TYPE: ACUTE PAIN

## 2023-04-19 ASSESSMENT — ENCOUNTER SYMPTOMS
ABDOMINAL PAIN: 0
NERVOUS/ANXIOUS: 1
NAUSEA: 0
DIARRHEA: 0
MYALGIAS: 0
WEAKNESS: 0
SHORTNESS OF BREATH: 0

## 2023-04-19 ASSESSMENT — FIBROSIS 4 INDEX: FIB4 SCORE: 8.93

## 2023-04-19 NOTE — ANESTHESIA TIME REPORT
Anesthesia Start and Stop Event Times     Date Time Event    4/18/2023 1627 Ready for Procedure     1717 Anesthesia Start     1834 Anesthesia Stop        Responsible Staff  04/18/23    Name Role Begin End    Serge Hess M.D. Anesth 1717 1834        Overtime Reason:  no overtime (within assigned shift)    Comments:

## 2023-04-19 NOTE — ANESTHESIA PROCEDURE NOTES
Airway    Date/Time: 4/18/2023 5:22 PM  Performed by: Serge Hess M.D.  Authorized by: Serge Hess M.D.     Location:  OR  Urgency:  Elective  Indications for Airway Management:  Anesthesia      Spontaneous Ventilation: absent    Sedation Level:  Deep  Preoxygenated: Yes    Patient Position:  Sniffing  Mask Difficulty Assessment:  0 - not attempted  Final Airway Type:  Endotracheal airway  Final Endotracheal Airway:  ETT  Cuffed: Yes    Technique Used for Successful ETT Placement:  Direct laryngoscopy    Insertion Site:  Oral  Blade Type:  Dsouza  Laryngoscope Blade/Videolaryngoscope Blade Size:  3  ETT Size (mm):  7.0  Measured from:  Teeth  ETT to Teeth (cm):  22  Placement Verified by: auscultation and capnometry    Cormack-Lehane Classification:  Grade I - full view of glottis  Number of Attempts at Approach:  1

## 2023-04-19 NOTE — OR NURSING
1828 Pt arrived to PACU with Anesthesiologist and OR RN. Sleeping. Even, unlabored respirations. VSS. Denies pain. Denies nausea. Abdominal lap sites x4 with dermabond CDI. Ice pack applied.    1845 POC update given to Yessy, sister, over the phone. All questions answered. Pt c/o 7/10 pain, PRN Fentanyl given. Pt has a significant history of PONV, therefore PRN oxycodone refused/held. Denies nausea at this time.     1908 Pt c/o mild nausea, PRN haldol given.     1923 FSBS 87    1955 Pt meets floor criteria. Report called to THALIA Beyer on GSU.     1958 Pt transported to T429 with transport. Chart and full oxygen tank with patient.

## 2023-04-19 NOTE — CARE PLAN
The patient is Stable - Low risk of patient condition declining or worsening    Shift Goals  Clinical Goals: Discharge planning  Patient Goals: Discharge planning    Progress made toward(s) clinical / shift goals: Discharge orders received. O2 saturation on room air with ambulation 85%. Patient awaiting home O2 set up prior to discharge. Incentive spirometer and ambulation encouraged.     Patient is not progressing towards the following goals: N/A

## 2023-04-19 NOTE — PROGRESS NOTES
Received report of patient at start of shift. Patient is AOx4, no complaints of pain. Assessment complete. Patient ambulates with standby assistance. Discharge orders received. Home O2 evaluation completed. O2 sat on room air with ambulation, 85%. Updated Dr. Noe. Awaiting home O2 set-up prior to discharge. Patient updated on plan of care, encouraged to notify staff for any needs/assistance. Call light within reach.

## 2023-04-19 NOTE — PROGRESS NOTES
4 Eyes Skin Assessment Completed by THALIA Beyer and THALIA Mccullough.    Head WDL  Ears WDL  Nose WDL  Mouth WDL  Neck Scar to L side  Breast/Chest WDL  Shoulder Blades WDL  Spine Redness, scratch to upper back  (R) Arm/Elbow/Hand WDL  (L) Arm/Elbow/Hand WDL  Abdomen Incision, x4 lap sites with dermabond  Groin WDL  Scrotum/Coccyx/Buttocks Redness and Blanching  (R) Leg Scab and Abrasion to back of thigh  (L) Leg Redness and Abrasion to back of thigh  (R) Heel/Foot/Toe WDL  (L) Heel/Foot/Toe WDL          Devices In Places Pulse Ox, SCD's, and Nasal Cannula      Interventions In Place NC W/Ear Foams, Heel Mepilex, Sacral Mepilex, Waffle Overlay, Pillows, and Pressure Redistribution Mattress    Possible Skin Injury No    Pictures Uploaded Into Epic N/A  Wound Consult Placed N/A  RN Wound Prevention Protocol Ordered No

## 2023-04-19 NOTE — OP REPORT
DATE OF OPERATION:   4/18/2023     PREOPERATIVE DIAGNOSIS: gallstone pancreatitis.    POSTOPERATIVE DIAGNOSIS: gallstone pancreatitis.    PROCEDURE PERFORMED: Laparoscopic cholecystectomy    SURGEON:    Isasc Galvan M.D.    ASSISTANT:    TAURUS Arrieta.      ANESTHESIOLOGIST:  Serge Hess M.D.    ANESTHESIA:   General endotracheal anesthesia.    ASA CLASSIFICATION:  III.    INDICATIONS: The patient is a 72 year-old woman with clinical and radiographic findings of gallstone pancreatitis. She is taken to the operating room for planned laparoscopic cholecystectomy.     The nature of the surgical procedure warranted additional skilled operative assistance from an Advanced Registered Nurse Practitioner (ARNP). The assistant was present during the entire operation. The surgical assistant performed the following: provided assistance with optimal surgical exposure of the operative field, operated the camera for the laparoscopic portion of the procedure, and performed the skin closure and dressing application.     FINDINGS: Mild inflammation of the gallbladder.    WOUND CLASSIFICATION:  Class II, Clean Contaminated.    SPECIMEN:    Gallbladder.    ESTIMATED BLOOD LOSS:  20 mL.    PROCEDURE: Following informed consent, the patient was properly identified, taken to the operating room and placed in supine position where general endotracheal anesthesia was administered. Intravenous antibiotics were administered by the anesthesiologist in correct time interval. The patient voided prior to surgery. A urinary catheter was not placed. Sequential compression devices were employed. The abdomen was prepped and draped into a sterile field. A timeout was conducted with the full attention and participation of all operating room personnel.    Marcaine 0.5% was used to infiltrate the port sites. An 11 mm supraumbilical midline incision was made and the subcutaneous tissues spread bluntly. The fascia was elevated with Kocher  clamps and sharply incised. The peritoneal cavity was entered bluntly and a Moncada trocar was inserted. Carbon dioxide pneumoperitoneum was instilled. A 5 mm 30 degree lens and camera was passed into the peritoneal cavity, initial diagnostic laparoscopy found no evidence of injury at the site of entry into the abdomen. An 11 mm port was placed in the epigastric midline under direct vision. Two 5 mm right subcostal ports were placed under direct vision.     The gallbladder was identified and elevated. Dissection was carried out to completely expose and delineate the hepatocystic triangle. The critical view was achieved definitively identifying the single cystic duct and single cystic artery entering the gallbladder. These structures were multiply clipped proximally, once distally and divided. The gallbladder was dissected free from the undersurface of the liver using electrocautery and placed within a laparoscopic specimen retrieval bag. The gallbladder was delivered intact from the abdominal cavity and submitted for pathology.  The gallbladder fossa was inspected. Hemostasis was satisfactory. The gallbladder fossa was inspected. Hemostasis was controlled with electrocautery.    The epigastric port site fascia was approximated using a trocar site closure device with a 0 VICRYL® Plus Antibacterial suture. The abdomen was desufflated and the ports were removed.  The supraumbilical port site fascia was approximated using an 0 VICRYL® Plus Antibacterial figure-of-eight suture. The port site skin incisions were closed with interrupted 4-0 MONOCRYL® subcuticular sutures. A DERMABOND ADVANCED® Topical Skin Adhesive dressing was applied.    The patient tolerated the procedure well, and there were no apparent complications. All sponge, needle, and instrument counts were correct on 2 separate occasions. The patient was awakened, extubated, and transferred to   the post anesthesia care unit (PACU) in satisfactory condition.        ____________________________________     Issac Galvan M.D.    DD: 4/18/2023  6:29 PM

## 2023-04-19 NOTE — CARE PLAN
The patient is Stable - Low risk of patient condition declining or worsening    Shift Goals  Clinical Goals: pain control, nausea control, rest  Patient Goals: pain control  Family Goals: support patient, sister called    Progress made toward(s) clinical / shift goals:  Medicated for N/V, pt resting in bed and ambulating to restroom.   Problem: Psychosocial  Goal: Patient's level of anxiety will decrease  Outcome: Progressing     Problem: Communication  Goal: The ability to communicate needs accurately and effectively will improve  Outcome: Progressing     Problem: Gastrointestinal Irritability  Goal: Nausea and vomiting will be absent or improve  Outcome: Progressing     Problem: Wound/ / Incision Healing  Goal: Patient's wound/surgical incision will decrease in size and heals properly  Outcome: Progressing       Patient is not progressing towards the following goals:

## 2023-04-19 NOTE — OP REPORT
Postoperative day #1 from a lap wanda  Overall feels well  Tolerating p.o.  Benign exam  Okay to discharge home from surgery standpoint  Surgery service is signing off

## 2023-04-19 NOTE — ANESTHESIA POSTPROCEDURE EVALUATION
Patient: Audrey Delgadillo    Procedure Summary     Date: 04/18/23 Room / Location: [place] [id number] / SURGERY [facility]    Anesthesia Start: 1717 Anesthesia Stop: 1834    Procedure: CHOLECYSTECTOMY, LAPAROSCOPIC (Abdomen) Diagnosis: ( gallstone pancreatitis)    Surgeons: Issac Galvan M.D. Responsible Provider: Serge Hess M.D.    Anesthesia Type: general ASA Status: 3          Final Anesthesia Type: general  Last vitals  BP   Blood Pressure : (!) 161/74    Temp   36.6 °C (97.9 °F)    Pulse   83   Resp   14    SpO2   99 %      Anesthesia Post Evaluation    Patient location during evaluation: PACU  Patient participation: complete - patient participated  Level of consciousness: awake and alert  Pain score: 0    Airway patency: patent  Anesthetic complications: no  Cardiovascular status: hemodynamically stable  Respiratory status: acceptable  Hydration status: euvolemic    PONV: none          No notable events documented.     Nurse Pain Score: 0 (NPRS)

## 2023-04-19 NOTE — DISCHARGE INSTRUCTIONS
Discharge Instructions per Malaika Noe M.D.    Your blood pressure has been high, please follow up with your primary care doctor in 2 weeks. You may need your medications adjusted.                Post Operative Discharge Instructions:    1. DIET: Upon discharge from the hospital, you may resume your normal preoperative diet, unless specifically directed otherwise. Depending on how you are feeling and whether you have nausea or not, you may wish to stay with a bland diet for the first few days. However, you can advance this as quickly as you feel ready.    2. ACTIVITIES: After discharge from the hospital, you may resume full routine activities; however, there should be no heavy lifting (greater than 20 pounds or a bag of groceries) and no strenuous activities for at least 2 weeks. The duration may be longer, depending on your surgical procedure. Routine activities of daily living are acceptable. Activity level should be addressed at your post-op follow up appointment.    3. DRIVING: You may drive whenever you are off pain medications and are able to perform the activities needed to drive, i.e., turning, bending, twisting, etc.    4. BATHING: You may get the wound wet at any time after leaving the hospital. You may shower, but do not submerge in a bath for at least two weeks.  If you have wound dressings, they may come off after 48 hours.  If you have skin glue to the wound, this will fall off on its own, do not pick at it.  If you have Steri-Strips to the wound, these will fall off on their own, do not pick at them. You may trim the edges if needed.    5. BOWEL FUNCTION:   After surgery, it is not uncommon for patients to develop either frequent or loose stools after meals. This usually corrects itself after a few days, to a few weeks. If this occurs, do not worry; this will resolve on its own.  Constipation is much more common than loose stools. The cause is the combination of pain medication and decreased  activity level and possibly the nature of the surgical procedure performed. If you feel this is occurring, you may use an over-the-counter treatment such as MiraLAX (or Milk of Magnesia, Ex-Lax, Senokot, etc.) until the problem has resolved. Drink plenty of water and try to wean off narcotic pain medications as soon as is comfortable for you.    6. PAIN MEDICATION:   You will be given a prescription for pain medication at discharge. Please take these as directed. It is important to remember not to take medications on an empty stomach as this may cause nausea.  You may also take over the counter acetaminophen and/or NSAIDS (ibuprofen, Aleve, Advil, Motrin) per the package instructions.  You may also use ice to the wound to decrease pain and swelling. You may alternate 20 minutes on and 20 minutes off with the ice for the first 24-48 hours. Make sure you place a washcloth or towel between the ice pack and your skin.  Please note that narcotic pain medication cannot be refilled unless you are seen by a doctor. Make sure you call the office if you are running low on medication or if the dose you have been prescribed is not working well for you.    7.CALL THE [facility] AT [phone] IF YOU HAVE:  (1) Fevers to more than 101F, (2) Unusual chest or leg pain, (3) Drainage or fluid from incision that may be foul smelling, increased tenderness or soreness at the wound or the wound edges are no longer together, redness or swelling at the incision site. Do not hesitate to call with any other questions.

## 2023-04-19 NOTE — PROGRESS NOTES
PREOPERATIVE NOTE: I have seen and examined the patient today.  Critical physical examination findings, laboratory indices, and radiographic studies reviewed.  I recommend laparoscopic, possible open, cholecystectomy.    The operative strategy was explained and reviewed. Alternatives discussed. Potential complications including, but not limited to: infection; bleeding; damage to adjacent structures, such as the common bile duct; incisional hernia; and anesthetic complications were discussed. Questions were elicited and answered to the patient's satisfaction.  Operative consent signed.        ____________________________________     Issac Galvan M.D.    DD: 4/18/2023  5:05 PM

## 2023-04-19 NOTE — FACE TO FACE
"Face to Face Note  -  Durable Medical Equipment    Malaika Noe M.D. - NPI: [id number]  I certify that this patient is under my care and that they had a durable medical equipment(DME)face to face encounter by myself that meets the physician DME face-to-face encounter requirements with this patient on:    Date of encounter:   Patient:                    MRN:                       YOB: 2025  Audrey Delgadillo  [id number]  1950     The encounter with the patient was in whole, or in part, for the following medical condition, which is the primary reason for durable medical equipment:  Other - chronic dyspnea    I certify that, based on my findings, the following durable medical equipment is medically necessary:    Oxygen   HOME O2 Saturation Measurements:(Values must be present for Home Oxygen orders)  Room air sat at rest: 92  Room air sat with amb: 85  With liters of O2: 1.5, O2 sat at rest with O2: 94  With Liters of O2: 1, O2 sat with amb with O2 : 91  Is the patient mobile?: Yes  If patient feels more short of breath, they can go up to 6 liters per minute and contact healthcare provider.    Supporting Symptoms: The patient requires supplemental oxygen, as the following interventions have been tried with limited or no improvement: \"Ambulation with oximetry and \"Incentive spirometry.    My Clinical findings support the need for the above equipment due to:  Hypoxia  "

## 2023-04-19 NOTE — PROGRESS NOTES
Report received from PACU RN Coleen. Pt arrived to T429. Assessment and skin check complete. Pain 5/10 ice pack in place to abdomen, pt drowsy.

## 2023-04-19 NOTE — PROGRESS NOTES
Hospital Medicine Daily Progress Note    Date of Service  4/19/2023    Chief Complaint  Audrey Delgadillo is a 72 y.o. female admitted 4/14/2023 with abd pain    Hospital Course  73 yo woman with alcohol use, HTN, HLD, GERD, hypothyroidism who had sudden abd pain and CT AP showed cholecystitis and pancreatitis. She was transferred from [facility] [place] for gallstone pancreatitis and severe sepsis. Surgery was consulted    Interval Problem Update  She has recovered well, tolerating diet. Working on weaning her off O2    I have discussed this patient's plan of care and discharge plan at IDT rounds today with Case Management, Nursing, Nursing leadership, and other members of the IDT team.    Consultants/Specialty  general surgery    Code Status  Full Code    Disposition  Patient is not medically cleared for discharge.   Anticipate discharge to to home with close outpatient follow-up.  I have placed the appropriate orders for post-discharge needs.    Review of Systems  Review of Systems   Constitutional:  Negative for malaise/fatigue.   Respiratory:  Negative for shortness of breath.    Cardiovascular:  Negative for chest pain.   Gastrointestinal:  Negative for abdominal pain, diarrhea and nausea.   Musculoskeletal:  Negative for myalgias.   Neurological:  Negative for weakness.   Psychiatric/Behavioral:  The patient is nervous/anxious.       Physical Exam  Temp:  [36 °C (96.8 °F)-36.7 °C (98.1 °F)] 36.7 °C (98.1 °F)  Pulse:  [84-92] 92  Resp:  [15-16] 16  BP: (110-163)/() 110/75  SpO2:  [90 %-96 %] 90 %    Physical Exam  Vitals and nursing note reviewed.   Constitutional:       General: She is not in acute distress.     Appearance: She is not toxic-appearing.   HENT:      Head: Normocephalic.      Mouth/Throat:      Mouth: Mucous membranes are moist.   Eyes:      General:         Right eye: No discharge.         Left eye: No discharge.   Cardiovascular:      Rate and Rhythm: Normal rate and regular rhythm.   Pulmonary:       Effort: Pulmonary effort is normal. No respiratory distress.      Breath sounds: No wheezing or rales.   Abdominal:      Palpations: Abdomen is soft.      Tenderness: There is no abdominal tenderness. There is no guarding or rebound.   Musculoskeletal:         General: No swelling.      Cervical back: Neck supple.   Skin:     General: Skin is warm and dry.   Neurological:      Mental Status: She is alert and oriented to person, place, and time.       Fluids  No intake or output data in the 24 hours ending 04/19/23 2028        Laboratory  Recent Labs     04/17/23  0553 04/18/23  0042 04/19/23  1223   WBC 11.8* 8.7 10.3   RBC 4.49 4.62 5.09   HEMOGLOBIN 13.1 13.5 14.8   HEMATOCRIT 38.0 39.2 43.6   MCV 84.6 84.8 85.7   MCH 29.2 29.2 29.1   MCHC 34.5 34.4 33.9   RDW 39.8 39.3 39.4   PLATELETCT 126* 132* 157*   MPV 10.4 10.2 11.4     Recent Labs     04/17/23  0553 04/18/23  0042 04/19/23  0947   SODIUM 132* 131* 128*   POTASSIUM 3.6 3.6 4.2   CHLORIDE 99 96 94*   CO2 20 20 18*   GLUCOSE 85 74 194*   BUN 10 11 18   CREATININE 0.63 0.66 0.77   CALCIUM 7.6* 7.5* 7.7*                   Imaging  EC-ECHOCARDIOGRAM COMPLETE W/O CONT   Final Result      OUTSIDE IMAGES-US ABDOMEN   Final Result      OUTSIDE IMAGES-CT ABDOMEN /PELVIS   Final Result      OUTSIDE IMAGES-DX CHEST   Final Result      XT-[id number]-Y/O    (Results Pending)        Assessment/Plan  * Acute gallstone pancreatitis  Assessment & Plan  Suspected  Elevated lipase  Cholecystitis and pancreatitis noted on CTAP  S/p cholecystectomy, tolerating diet    Thrombocytopenia (HCC)  Assessment & Plan  Mild    Alcohol dependence (HCC)  Assessment & Plan  Multivitamins  No signs of alcohol withdrawal    Hyperglycemia  Assessment & Plan  Continue insulin sliding scale with hypoglycemia protocol.    Hypomagnesemia  Assessment & Plan  Recheck level tomorrow    Hypokalemia  Assessment & Plan  Recheck level tomorrow    ACP (advance care planning)  Assessment & Plan  Full  code    Lactic acidosis  Assessment & Plan  Likely secondary to severe sepsis  IVF  Antibiotic  Now resolved.    Severe sepsis (HCC)  Assessment & Plan  This is Severe Sepsis Present on admission  SIRS criteria identified on my evaluation include: Tachycardia, with heart rate greater than 90 BPM and Tachypnea, with respirations greater than 20 per minute  Clinical indicators of end organ dysfunction include Lactate >2 mmol/L (18.0 mg/dL)  Source is GI  Sepsis protocol initiated  Crystalloid Fluid Administration: Fluid resuscitation ordered per standard protocol - 30 mL/kg per current or ideal body weight  IV antibiotics as appropriate for source of sepsis  Reassessment: I have reassessed the patient's hemodynamic status  Resolved    Choledocholithiasis with acute cholecystitis  Assessment & Plan  Suspected  [place]AP from [facility] [place]: Mild circumferential gallbladder wall thickening and mild peripancreatic inflammatory changes are concerning for cholecystitis and pancreatitis.  Stone in the pancreatic duct with dilatation of more proximal pancreatic duct.  Continue on ceftriaxone and Flagyl  S/o cholecystectomy    Cholecystitis  Assessment & Plan  Noted on CTAP  Continue antibiotics and plan for cholecystectomy today    Schwannoma of cranial nerve (HCC)- (present on admission)  Assessment & Plan  Per history   outpatient follow-up    Class 1 obesity in adult  Assessment & Plan  Diet and lifestyle modification  Body mass index is 34.1 kg/m².      Gastroesophageal reflux disease without esophagitis- (present on admission)  Assessment & Plan  Continue IV pantoprazole    History of breast cancer- (present on admission)  Assessment & Plan  History of  Treated with surgery, chemoradiation and subsequent bilateral oophorectomy    Hypothyroidism- (present on admission)  Assessment & Plan  Continue outpatient Synthroid    Dyslipidemia- (present on admission)  Assessment & Plan  Continue outpatient statin    Essential hypertension-  (present on admission)  Assessment & Plan  Continue losartan 50 mg  Plan to increase Norvasc for uncontrolled hypertension         VTE prophylaxis: SCDs/TEDs    I have performed a physical exam and reviewed and updated ROS and Plan today (4/19/2023). In review of yesterday's note (4/18/2023), there are no changes except as documented above.

## 2023-04-20 VITALS
DIASTOLIC BLOOD PRESSURE: 69 MMHG | WEIGHT: 163 LBS | BODY MASS INDEX: 32 KG/M2 | HEIGHT: 60 IN | SYSTOLIC BLOOD PRESSURE: 133 MMHG | HEART RATE: 77 BPM | OXYGEN SATURATION: 95 % | RESPIRATION RATE: 17 BRPM | TEMPERATURE: 97.3 F

## 2023-04-20 LAB — GLUCOSE BLD STRIP.AUTO-MCNC: 111 MG/DL (ref 65–99)

## 2023-04-20 PROCEDURE — A9270 NON-COVERED ITEM OR SERVICE: HCPCS | Performed by: INTERNAL MEDICINE

## 2023-04-20 PROCEDURE — A9270 NON-COVERED ITEM OR SERVICE: HCPCS | Performed by: STUDENT IN AN ORGANIZED HEALTH CARE EDUCATION/TRAINING PROGRAM

## 2023-04-20 PROCEDURE — 700102 HCHG RX REV CODE 250 W/ 637 OVERRIDE(OP): Performed by: INTERNAL MEDICINE

## 2023-04-20 PROCEDURE — 700102 HCHG RX REV CODE 250 W/ 637 OVERRIDE(OP): Performed by: STUDENT IN AN ORGANIZED HEALTH CARE EDUCATION/TRAINING PROGRAM

## 2023-04-20 PROCEDURE — 99239 HOSP IP/OBS DSCHRG MGMT >30: CPT | Performed by: INTERNAL MEDICINE

## 2023-04-20 PROCEDURE — 82962 GLUCOSE BLOOD TEST: CPT

## 2023-04-20 RX ADMIN — METRONIDAZOLE 500 MG: 500 TABLET ORAL at 06:55

## 2023-04-20 RX ADMIN — LEVOTHYROXINE SODIUM 112 MCG: 0.11 TABLET ORAL at 06:56

## 2023-04-20 RX ADMIN — AMLODIPINE BESYLATE 5 MG: 10 TABLET ORAL at 06:56

## 2023-04-20 RX ADMIN — FAMOTIDINE 20 MG: 20 TABLET, FILM COATED ORAL at 06:56

## 2023-04-20 ASSESSMENT — COGNITIVE AND FUNCTIONAL STATUS - GENERAL
DAILY ACTIVITIY SCORE: 21
WALKING IN HOSPITAL ROOM: A LITTLE
TURNING FROM BACK TO SIDE WHILE IN FLAT BAD: A LITTLE
CLIMB 3 TO 5 STEPS WITH RAILING: A LITTLE
TOILETING: A LITTLE
DRESSING REGULAR LOWER BODY CLOTHING: A LITTLE
SUGGESTED CMS G CODE MODIFIER DAILY ACTIVITY: CJ
HELP NEEDED FOR BATHING: A LITTLE
STANDING UP FROM CHAIR USING ARMS: A LITTLE
MOVING FROM LYING ON BACK TO SITTING ON SIDE OF FLAT BED: A LITTLE
MOVING TO AND FROM BED TO CHAIR: A LITTLE
SUGGESTED CMS G CODE MODIFIER MOBILITY: CK
MOBILITY SCORE: 18

## 2023-04-20 ASSESSMENT — PAIN DESCRIPTION - PAIN TYPE: TYPE: ACUTE PAIN

## 2023-04-20 NOTE — DISCHARGE PLANNING
Case Management Discharge Planning    Admission Date: 4/14/2023  GMLOS: 9.6  ALOS: 6    6-Clicks ADL Score: 20  6-Clicks Mobility Score: 15  PT and/or OT Eval ordered: Yes  Post-acute Referrals Ordered: Yes  Post-acute Choice Obtained: Yes  Has referral(s) been sent to post-acute provider:  Yes      Anticipated Discharge Dispo:      DME Needed: Yes    DME Ordered: Yes. Oxygen.    Action(s) Taken: OTHER. Received order for home oxygen. LSW met with patient and issued choice. [facility]'s choice is [facility], choice form faxed to [facility].    Escalations Completed: None    Medically Clear: Yes    Next Steps: Awaiting approval for Home Oxygen.    Barriers to Discharge: DME    Is the patient up for discharge tomorrow:

## 2023-04-20 NOTE — DISCHARGE SUMMARY
Discharge Summary    CHIEF COMPLAINT ON ADMISSION  No chief complaint on file.      Reason for Admission  Gallstone pancreatitis     Admission Date  4/14/2023    CODE STATUS  Full Code    HPI & HOSPITAL COURSE  71 yo woman with alcohol use, HTN, HLD, GERD, hypothyroidism who had sudden abd pain and CT AP showed cholecystitis and pancreatitis. She was transferred from [facility] [place] for gallstone pancreatitis and severe sepsis. Surgery was consulted.  RCP was delayed so she underwent lap cholecystectomy with Dr. Galvan 4/18.  Postoperatively she did well, tolerated her diet and had no abdominal pain.  She was treated with a course of antibiotics.  Postop she did have mild hypoxia, 85% on room air needing 1 to 2 L of O2.  She was ambulating and using her I-S but unable to wean off.  She was ordered for home oxygen, however she did not want to wait for it and wanted to go home.  I discussed the risks of leaving without the oxygen including shortness of breath, syncope, possible death.  She understands these risks.  She was able to ambulate down the hallway without oxygen and was asymptomatic.  She will be leaving AGAINST MEDICAL ADVICE.      The patient met 2-midnight criteria for an inpatient stay at the time of discharge.    Discharge Date  4/20/2023    FOLLOW UP ITEMS POST DISCHARGE  She will follow-up with her PCP next week    DISCHARGE DIAGNOSES  Principal Problem:    Acute gallstone pancreatitis POA: Unknown  Active Problems:    Essential hypertension POA: Yes      Overview: ICD-10 transition    Dyslipidemia POA: Yes    Hypothyroidism POA: Yes      Overview: ICD-10 transition    History of breast cancer POA: Yes    Gastroesophageal reflux disease without esophagitis POA: Yes    Class 1 obesity in adult POA: Unknown    Schwannoma of cranial nerve (HCC) POA: Yes    Cholecystitis POA: Unknown    Choledocholithiasis with acute cholecystitis POA: Unknown    Severe sepsis (HCC) POA: Unknown    Lactic acidosis POA: Unknown    ACP  (advance care planning) POA: Unknown    Hypokalemia POA: Unknown    Hypomagnesemia POA: Unknown    Hyperglycemia POA: Unknown    Alcohol dependence (HCC) POA: Unknown    Thrombocytopenia (HCC) POA: Unknown  Resolved Problems:    * No resolved hospital problems. *      FOLLOW UP  Future Appointments   Date Time Provider Department Center   10/3/2023  1:00 PM Jairo Landry D.O. [facility] None     Issac Galvan M.D.  [address]  [address]  [place] [address] [phone]    Follow up in 2 week(s)      [facility]  [address]  [place] [address]  [phone]    Schedule an appointment as soon as possible for a visit in 2 week(s)  Follow up in ACS clinic in 2 weeks, or sooner if needed. May request telemedicine visit if doing well with no issues at home.      MEDICATIONS ON DISCHARGE     Medication List        START taking these medications        Instructions   cefdinir 300 MG Caps  Commonly known as: OMNICEF   Take 1 Capsule by mouth 2 times a day for 2 doses.  Dose: 300 mg     metroNIDAZOLE 500 MG Tabs  Commonly known as: FLAGYL   Take 1 Tablet by mouth every 8 hours for 4 doses.  Dose: 500 mg            CHANGE how you take these medications        Instructions   atorvastatin 10 MG Tabs  What changed: Another medication with the same name was removed. Continue taking this medication, and follow the directions you see here.  Commonly known as: LIPITOR   Take 10 mg by mouth every evening.  Dose: 10 mg            CONTINUE taking these medications        Instructions   aspirin EC 81 MG Tbec  Commonly known as: ECOTRIN   Take 81 mg by mouth every day.  Dose: 81 mg     CALCIUM PO   Take 2 Tablets by mouth every day.  Dose: 2 Tablet     diazePAM 5 MG Tabs  Commonly known as: VALIUM   Take 5 mg by mouth every 12 hours as needed for Anxiety.  Dose: 5 mg     doxylamine 25 MG Tabs tablet  Commonly known as: UNISOM   Take 25 mg by mouth at bedtime.  Dose: 25 mg     ezetimibe 10 MG Tabs  Commonly known as:  "ZETIA   Take 10 mg by mouth every day.  Dose: 10 mg     levothyroxine 112 MCG Tabs  Commonly known as: SYNTHROID   Take 56 mcg by mouth every morning on an empty stomach. 56 mcg = 1/2 tablet  Dose: 56 mcg     losartan 50 MG Tabs  Commonly known as: COZAAR   Take 1 Tablet by mouth 2 times a day.  Dose: 50 mg     multivitamin per tablet   Take 1 Tab by mouth every day.  Dose: 1 Tablet     nitroglycerin 0.4 MG Subl  Commonly known as: NITROSTAT   Place 0.4 mg under the tongue.  Dose: 0.4 mg     potassium chloride 10 MEQ capsule  Commonly known as: MICRO-K   Take 10 mEq by mouth every day.  Dose: 10 mEq              Allergies  Allergies   Allergen Reactions    Ezetimibe      \"Headache, dizzy\"    Keflex Diarrhea     diarrhea    Morphine Unspecified     hallucinate       DIET  Orders Placed This Encounter   Procedures    Diet Order Diet: Consistent CHO (Diabetic) (Diabetic)     Standing Status:   Standing     Number of Occurrences:   1     Order Specific Question:   Diet:     Answer:   Consistent CHO (Diabetic) [4]     Comments:   Diabetic       ACTIVITY  As tolerated.    CONSULTATIONS  surgery    PROCEDURES  PROCEDURE PERFORMED:           Laparoscopic cholecystectomy     SURGEON:                             Issac Galvan M.D.    LABORATORY  Lab Results   Component Value Date    SODIUM 128 (L) 04/19/2023    POTASSIUM 4.2 04/19/2023    CHLORIDE 94 (L) 04/19/2023    CO2 18 (L) 04/19/2023    GLUCOSE 194 (H) 04/19/2023    BUN 18 04/19/2023    CREATININE 0.77 04/19/2023    CREATININE 1.0 10/03/2008    GLOMRATE 62 02/09/2023        Lab Results   Component Value Date    WBC 10.3 04/19/2023    HEMOGLOBIN 14.8 04/19/2023    HEMATOCRIT 43.6 04/19/2023    PLATELETCT 157 (L) 04/19/2023        Total time of the discharge process exceeds 34 minutes.  "

## 2023-04-20 NOTE — PROGRESS NOTES
"AA&Ox 4. On 1L of oxygen while at rest; up to 2L when ambulating.  Pt declines surgical pain and requested only ice pack for acute abd pain due to \"bloating. PRN pain meds available per MAR.  Denies SOB/chest pain/numbness or tingling.  Skin per flowsheets.  + void, + flatus. LBM 4/19.  Denies N/V. Tolerating diabetic diet.  Pt ambulates with x1 assist using a FWW.  SCD's on to BLE. Lovenox in use for VTE prophylaxis.  "

## 2023-04-20 NOTE — DISCHARGE PLANNING
0822-  Received Choice Form @: 0808  Agency/Facility Name: 1) [facility]  Referral Sent per Choice Form @: 0818    0955-  Agency/Facility Name: [facility]  Spoke To: Felicia  Outcome: Per Felicia, she will look at pt referral and call [facility] back if any more information is needed. Per Felicia, facility is currently working on order.

## 2023-04-20 NOTE — CARE PLAN
"The patient is Watcher - Medium risk of patient condition declining or worsening    Shift Goals  Clinical Goals: patient safety  Patient Goals: \"I'm going home\"  Family Goals: n/a    Progress made toward(s) clinical / shift goals:  Patient has decided to leave against medical advice. PIV access has been removed. Risks of leaving AMA have been discussed with patient. Dr. Malaika Noe is aware.    Patient is not progressing towards the following goals: N/A    Problem: Psychosocial  Goal: Patient's level of anxiety will decrease  Outcome: AMA  Goal: Patient's ability to verbalize feelings about condition will improve  Outcome: AMA  Goal: Patient's ability to re-evaluate and adapt role responsibilities will improve  Outcome: AMA  Goal: Patient and family will demonstrate ability to cope with life altering diagnosis and/or procedure  Outcome: AMA  Goal: Spiritual and cultural needs incorporated into hospitalization  Outcome: AMA     Problem: Communication  Goal: The ability to communicate needs accurately and effectively will improve  Outcome: AMA     Problem: Discharge Barriers/Planning  Goal: Patient's continuum of care needs are met  Outcome: AMA     Problem: Hemodynamics  Goal: Patient's hemodynamics, fluid balance and neurologic status will be stable or improve  Outcome: AMA     Problem: Respiratory  Goal: Patient will achieve/maintain optimum respiratory ventilation and gas exchange  Outcome: AMA     Problem: Chest Tube Management  Goal: Complications related to chest tube will be avoided or minimized  Outcome: AMA     Problem: Fluid Volume  Goal: Fluid volume balance will be maintained  Outcome: AMA     Problem: Mechanical Ventilation  Goal: Safe management of artificial airway and ventilation  Outcome: AMA  Goal: Successful weaning off mechanical ventilator, spontaneously maintains adequate gas exchange  Outcome: AMA  Goal: Patient will be able to express needs and understand communication  Outcome: AMA   "   Problem: Dysphagia  Goal: Dysphagia will improve  Outcome: AMA     Problem: Risk for Aspiration  Goal: Patient's risk for aspiration will be absent or decrease  Outcome: AMA     Problem: Nutrition  Goal: Patient's nutritional and fluid intake will be adequate or improve  Outcome: AMA  Goal: Enteral nutrition will be maintained or improve  Outcome: AMA  Goal: Enteral nutrition will be maintained or improve  Outcome: AMA     Problem: Urinary Elimination  Goal: Establish and maintain regular urinary output  Outcome: AMA     Problem: Bowel Elimination  Goal: Establish and maintain regular bowel function  Outcome: AMA     Problem: Gastrointestinal Irritability  Goal: Nausea and vomiting will be absent or improve  Outcome: AMA  Goal: Diarrhea will be absent or improved  Outcome: AMA     Problem: Rectal Tube  Goal: Fecal output will be contained and skin will remain free from irritation  Outcome: AMA     Problem: Mobility  Goal: Patient's capacity to carry out activities will improve  Outcome: AMA     Problem: Self Care  Goal: Patient will have the ability to perform ADLs independently or with assistance (bathe, groom, dress, toilet and feed)  Outcome: AMA

## 2023-04-20 NOTE — PROGRESS NOTES
Patient has chosen to leave against medical advice (AMA). Patient has been educated on risks of leaving without home oxygen arranged. Patient has verbalized comprehension of risks and stated her desire to leave regardless. Dr. Malaika Noe is aware. Patient's peripheral IV access has been removed. Patient is awaiting arrival of her transportation home.

## 2023-04-21 NOTE — PROGRESS NOTES
Patient discharged AMA at this time. Patient was educated on risks of leaving without home oxygen supplies. Patient verbalized comprehension. Patient left with all belongings.

## 2023-05-10 ENCOUNTER — APPOINTMENT (RX ONLY)
Dept: URBAN - METROPOLITAN AREA CLINIC 31 | Facility: CLINIC | Age: 73
Setting detail: DERMATOLOGY
End: 2023-05-10

## 2023-05-10 DIAGNOSIS — L82.1 OTHER SEBORRHEIC KERATOSIS: ICD-10-CM

## 2023-05-10 DIAGNOSIS — D18.0 HEMANGIOMA: ICD-10-CM

## 2023-05-10 DIAGNOSIS — Z71.89 OTHER SPECIFIED COUNSELING: ICD-10-CM

## 2023-05-10 DIAGNOSIS — D22 MELANOCYTIC NEVI: ICD-10-CM

## 2023-05-10 DIAGNOSIS — L81.4 OTHER MELANIN HYPERPIGMENTATION: ICD-10-CM

## 2023-05-10 PROBLEM — D22.71 MELANOCYTIC NEVI OF RIGHT LOWER LIMB, INCLUDING HIP: Status: ACTIVE | Noted: 2023-05-10

## 2023-05-10 PROBLEM — D18.01 HEMANGIOMA OF SKIN AND SUBCUTANEOUS TISSUE: Status: ACTIVE | Noted: 2023-05-10

## 2023-05-10 PROCEDURE — ? COUNSELING

## 2023-05-10 PROCEDURE — 99203 OFFICE O/P NEW LOW 30 MIN: CPT

## 2023-05-10 ASSESSMENT — LOCATION ZONE DERM
LOCATION ZONE: ARM
LOCATION ZONE: LEG
LOCATION ZONE: TRUNK

## 2023-05-10 ASSESSMENT — LOCATION SIMPLE DESCRIPTION DERM
LOCATION SIMPLE: RIGHT CALF
LOCATION SIMPLE: LEFT UPPER ARM
LOCATION SIMPLE: LEFT FOREARM
LOCATION SIMPLE: CHEST

## 2023-05-10 ASSESSMENT — LOCATION DETAILED DESCRIPTION DERM
LOCATION DETAILED: RIGHT MEDIAL INFERIOR CHEST
LOCATION DETAILED: LEFT ANTERIOR DISTAL UPPER ARM
LOCATION DETAILED: RIGHT PROXIMAL CALF
LOCATION DETAILED: LEFT PROXIMAL DORSAL FOREARM

## 2023-05-10 NOTE — PROCEDURE: COUNSELING
Detail Level: Generalized
Sunscreen Recommendation Label Override: Broad Spectrum Sunscreen minimum SPF 30+

## 2023-05-27 PROBLEM — M75.100 ROTATOR CUFF TEAR: Status: RESOLVED | Noted: 2018-03-07 | Resolved: 2023-05-27

## 2023-05-27 PROBLEM — I65.22 LEFT CAROTID ARTERY OCCLUSION: Status: RESOLVED | Noted: 2022-03-15 | Resolved: 2023-05-27

## 2023-05-27 PROBLEM — F10.20 ALCOHOL DEPENDENCE (HCC): Status: RESOLVED | Noted: 2023-04-14 | Resolved: 2023-05-27

## 2023-05-27 PROBLEM — A41.9 SEVERE SEPSIS (HCC): Status: RESOLVED | Noted: 2023-04-14 | Resolved: 2023-05-27

## 2023-05-27 PROBLEM — E87.1 HYPONATREMIA: Status: ACTIVE | Noted: 2023-05-27

## 2023-05-27 PROBLEM — Z86.010 HISTORY OF COLON POLYPS: Status: RESOLVED | Noted: 2017-03-15 | Resolved: 2023-05-27

## 2023-05-27 PROBLEM — D69.6 THROMBOCYTOPENIA (HCC): Status: RESOLVED | Noted: 2023-04-17 | Resolved: 2023-05-27

## 2023-05-27 PROBLEM — R94.31 ABNORMAL ECG: Status: RESOLVED | Noted: 2023-01-17 | Resolved: 2023-05-27

## 2023-05-27 PROBLEM — R60.0 EXTREMITY EDEMA: Status: RESOLVED | Noted: 2022-04-21 | Resolved: 2023-05-27

## 2023-05-27 PROBLEM — K85.10 ACUTE GALLSTONE PANCREATITIS: Status: RESOLVED | Noted: 2023-04-14 | Resolved: 2023-05-27

## 2023-05-27 PROBLEM — I65.09 VERTEBRAL ARTERY OBSTRUCTION: Status: RESOLVED | Noted: 2022-03-15 | Resolved: 2023-05-27

## 2023-05-27 PROBLEM — E87.6 HYPOKALEMIA: Status: RESOLVED | Noted: 2023-04-14 | Resolved: 2023-05-27

## 2023-05-27 PROBLEM — D33.3 SCHWANNOMA OF CRANIAL NERVE (HCC): Status: RESOLVED | Noted: 2021-07-14 | Resolved: 2023-05-27

## 2023-05-27 PROBLEM — R65.20 SEVERE SEPSIS (HCC): Status: RESOLVED | Noted: 2023-04-14 | Resolved: 2023-05-27

## 2023-05-27 PROBLEM — R22.1 LOCALIZED SWELLING, MASS AND LUMP, NECK: Status: RESOLVED | Noted: 2021-10-11 | Resolved: 2023-05-27

## 2023-05-27 PROBLEM — R73.9 HYPERGLYCEMIA: Status: RESOLVED | Noted: 2023-04-14 | Resolved: 2023-05-27

## 2023-05-27 PROBLEM — D35.5: Status: RESOLVED | Noted: 2023-04-06 | Resolved: 2023-05-27

## 2023-05-27 PROBLEM — E87.20 LACTIC ACIDOSIS: Status: RESOLVED | Noted: 2023-04-14 | Resolved: 2023-05-27

## 2023-05-27 PROBLEM — K80.42 CHOLEDOCHOLITHIASIS WITH ACUTE CHOLECYSTITIS: Status: RESOLVED | Noted: 2023-04-14 | Resolved: 2023-05-27

## 2023-05-27 PROBLEM — E78.2 MIXED HYPERLIPIDEMIA: Status: RESOLVED | Noted: 2021-04-20 | Resolved: 2023-05-27

## 2023-05-27 PROBLEM — K81.9 CHOLECYSTITIS: Status: RESOLVED | Noted: 2023-04-14 | Resolved: 2023-05-27

## 2023-05-30 PROBLEM — R51.9 HEADACHE: Status: ACTIVE | Noted: 2023-05-30

## 2023-07-04 PROBLEM — R55 SYNCOPE: Status: ACTIVE | Noted: 2023-07-04

## 2023-07-05 PROBLEM — I95.1 ORTHOSTATIC HYPOTENSION: Status: ACTIVE | Noted: 2023-07-05

## 2024-05-21 ENCOUNTER — APPOINTMENT (RX ONLY)
Dept: URBAN - METROPOLITAN AREA CLINIC 31 | Facility: CLINIC | Age: 74
Setting detail: DERMATOLOGY
End: 2024-05-21

## 2024-05-21 DIAGNOSIS — L82.1 OTHER SEBORRHEIC KERATOSIS: ICD-10-CM

## 2024-05-21 DIAGNOSIS — L81.4 OTHER MELANIN HYPERPIGMENTATION: ICD-10-CM

## 2024-05-21 DIAGNOSIS — D485 NEOPLASM OF UNCERTAIN BEHAVIOR OF SKIN: ICD-10-CM

## 2024-05-21 DIAGNOSIS — L57.8 OTHER SKIN CHANGES DUE TO CHRONIC EXPOSURE TO NONIONIZING RADIATION: ICD-10-CM

## 2024-05-21 DIAGNOSIS — D18.0 HEMANGIOMA: ICD-10-CM

## 2024-05-21 DIAGNOSIS — Z71.89 OTHER SPECIFIED COUNSELING: ICD-10-CM

## 2024-05-21 PROBLEM — D48.5 NEOPLASM OF UNCERTAIN BEHAVIOR OF SKIN: Status: ACTIVE | Noted: 2024-05-21

## 2024-05-21 PROBLEM — D18.01 HEMANGIOMA OF SKIN AND SUBCUTANEOUS TISSUE: Status: ACTIVE | Noted: 2024-05-21

## 2024-05-21 PROCEDURE — 11102 TANGNTL BX SKIN SINGLE LES: CPT

## 2024-05-21 PROCEDURE — 99213 OFFICE O/P EST LOW 20 MIN: CPT | Mod: 25

## 2024-05-21 PROCEDURE — ? BIOPSY BY SHAVE METHOD

## 2024-05-21 PROCEDURE — ? COUNSELING

## 2024-05-21 ASSESSMENT — LOCATION DETAILED DESCRIPTION DERM
LOCATION DETAILED: LEFT DISTAL DORSAL FOREARM
LOCATION DETAILED: RIGHT DISTAL DORSAL FOREARM
LOCATION DETAILED: LEFT INFERIOR CENTRAL MALAR CHEEK
LOCATION DETAILED: RIGHT INFERIOR CENTRAL MALAR CHEEK
LOCATION DETAILED: RIGHT INFERIOR MEDIAL MIDBACK
LOCATION DETAILED: PERIUMBILICAL SKIN
LOCATION DETAILED: NASAL DORSUM

## 2024-05-21 ASSESSMENT — LOCATION ZONE DERM
LOCATION ZONE: NOSE
LOCATION ZONE: ARM
LOCATION ZONE: FACE
LOCATION ZONE: TRUNK

## 2024-05-21 ASSESSMENT — LOCATION SIMPLE DESCRIPTION DERM
LOCATION SIMPLE: ABDOMEN
LOCATION SIMPLE: LEFT CHEEK
LOCATION SIMPLE: LEFT FOREARM
LOCATION SIMPLE: NOSE
LOCATION SIMPLE: RIGHT LOWER BACK
LOCATION SIMPLE: RIGHT FOREARM
LOCATION SIMPLE: RIGHT CHEEK

## 2024-06-17 ENCOUNTER — APPOINTMENT (RX ONLY)
Dept: URBAN - METROPOLITAN AREA CLINIC 31 | Facility: CLINIC | Age: 74
Setting detail: DERMATOLOGY
End: 2024-06-17

## 2024-06-17 DIAGNOSIS — L57.0 ACTINIC KERATOSIS: ICD-10-CM

## 2024-06-17 PROCEDURE — 17000 DESTRUCT PREMALG LESION: CPT

## 2024-06-17 PROCEDURE — ? COUNSELING

## 2024-06-17 PROCEDURE — ? DIAGNOSIS COMMENT

## 2024-06-17 PROCEDURE — ? LIQUID NITROGEN

## 2024-06-17 ASSESSMENT — LOCATION DETAILED DESCRIPTION DERM: LOCATION DETAILED: NASAL DORSUM

## 2024-06-17 ASSESSMENT — LOCATION SIMPLE DESCRIPTION DERM: LOCATION SIMPLE: NOSE

## 2024-06-17 ASSESSMENT — LOCATION ZONE DERM: LOCATION ZONE: NOSE

## 2024-06-17 NOTE — PROCEDURE: LIQUID NITROGEN
Show Applicator Variable?: Yes
Detail Level: Detailed
Number Of Freeze-Thaw Cycles: 1 freeze-thaw cycle
Render Post-Care Instructions In Note?: no
Duration Of Freeze Thaw-Cycle (Seconds): 3
Post-Care Instructions: I reviewed with the patient in detail post-care instructions. Patient is to wear sunprotection, and avoid picking at any of the treated lesions. Pt may apply Vaseline to crusted or scabbing areas.
Application Tool (Optional): Cry-AC
Consent: The patient's consent was obtained including but not limited to risks of crusting, scabbing, blistering, scarring, darker or lighter pigmentary change, recurrence, incomplete removal and infection.

## 2024-12-16 ENCOUNTER — APPOINTMENT (OUTPATIENT)
Dept: URBAN - METROPOLITAN AREA CLINIC 31 | Facility: CLINIC | Age: 74
Setting detail: DERMATOLOGY
End: 2024-12-16

## 2024-12-16 DIAGNOSIS — L82.1 OTHER SEBORRHEIC KERATOSIS: ICD-10-CM

## 2024-12-16 DIAGNOSIS — L81.4 OTHER MELANIN HYPERPIGMENTATION: ICD-10-CM

## 2024-12-16 DIAGNOSIS — D18.0 HEMANGIOMA: ICD-10-CM

## 2024-12-16 DIAGNOSIS — L28.1 PRURIGO NODULARIS: ICD-10-CM

## 2024-12-16 DIAGNOSIS — L57.8 OTHER SKIN CHANGES DUE TO CHRONIC EXPOSURE TO NONIONIZING RADIATION: ICD-10-CM

## 2024-12-16 PROBLEM — D18.01 HEMANGIOMA OF SKIN AND SUBCUTANEOUS TISSUE: Status: ACTIVE | Noted: 2024-12-16

## 2024-12-16 PROCEDURE — ? COUNSELING

## 2024-12-16 PROCEDURE — ? SUNSCREEN RECOMMENDATIONS

## 2024-12-16 PROCEDURE — 99213 OFFICE O/P EST LOW 20 MIN: CPT

## 2024-12-16 PROCEDURE — ? ITCH-SCRATCH CYCLE COUNSELING

## 2024-12-16 PROCEDURE — ? ADDITIONAL NOTES

## 2024-12-16 NOTE — PROCEDURE: ADDITIONAL NOTES
Additional Notes: Pt declines topical today.
Detail Level: Simple
Render Risk Assessment In Note?: no

## (undated) DEVICE — GLOVE BIOGEL INDICATOR SZ 8 SURGICAL PF LTX - (50/BX 4BX/CA)

## (undated) DEVICE — SET LEADWIRE 5 LEAD BEDSIDE DISPOSABLE ECG (1SET OF 5/EA)

## (undated) DEVICE — GLOVE BIOGEL PI INDICATOR SZ 6.5 SURGICAL PF LF - (50/BX 4BX/CA)

## (undated) DEVICE — PACK LAP CHOLE OR - (2EA/CA)

## (undated) DEVICE — TUBE E-T HI-LO CUFF 7.0MM (10EA/PK)

## (undated) DEVICE — TUBING CLEARLINK DUO-VENT - C-FLO (48EA/CA)

## (undated) DEVICE — SODIUM CHL IRRIGATION 0.9% 1000ML (12EA/CA)

## (undated) DEVICE — GOWN WARMING STANDARD FLEX - (30/CA)

## (undated) DEVICE — CHLORAPREP 26 ML APPLICATOR - ORANGE TINT(25/CA)

## (undated) DEVICE — SUTURE 4-0 MONOCRYL PLUS PS-1 - 27 INCH (36/BX)

## (undated) DEVICE — ANTI-FOG SOLUTION - 60BTL/CA

## (undated) DEVICE — SET EXTENSION WITH 2 PORTS (48EA/CA) ***PART #2C8610 IS A SUBSTITUTE*****

## (undated) DEVICE — SLEEVE, VASO, THIGH, MED

## (undated) DEVICE — TROCAR Z THREAD 12 X 100 - BLADED (6/BX)

## (undated) DEVICE — CANNULA W/SEAL 5X100 Z-THRE - ADED KII (12/BX)

## (undated) DEVICE — SUTURE GENERAL

## (undated) DEVICE — TROCAR LAPSCP 100MM 12MM NTHRD - (6/BX)

## (undated) DEVICE — CLIP MED LG INTNL HRZN TI ESCP - (20/BX)

## (undated) DEVICE — SENSOR OXIMETER ADULT SPO2 RD SET (20EA/BX)

## (undated) DEVICE — COVER LIGHT HANDLE ALC PLUS DISP (18EA/BX)

## (undated) DEVICE — GLOVE SZ 6 BIOGEL PI MICRO - PF LF (50PR/BX 4BX/CA)

## (undated) DEVICE — GLOVE SZ 7 BIOGEL PI MICRO - PF LF (50PR/BX 4BX/CA)

## (undated) DEVICE — CANISTER SUCTION 3000ML MECHANICAL FILTER AUTO SHUTOFF MEDI-VAC NONSTERILE LF DISP  (40EA/CA)

## (undated) DEVICE — DERMABOND ADVANCED - (12EA/BX)

## (undated) DEVICE — ELECTRODE 5MM LHK LAPSCP STERILE DISP- MEGADYNE  (5/CA)

## (undated) DEVICE — LACTATED RINGERS INJ 1000 ML - (14EA/CA 60CA/PF)

## (undated) DEVICE — BAG RETRIEVAL 10ML (10EA/BX)

## (undated) DEVICE — SUTURE 0 VICRYL PLUS UR-6 - 27 INCH (36/BX)

## (undated) DEVICE — GLOVE BIOGEL SZ 7.5 SURGICAL PF LTX - (50PR/BX 4BX/CA)

## (undated) DEVICE — ELECTRODE DUAL RETURN W/ CORD - (50/PK)

## (undated) DEVICE — NEEDLE INSFL 120MM 14GA VRRS - (20/BX)

## (undated) DEVICE — GOWN SURGEONS X-LARGE - DISP. (30/CA)

## (undated) DEVICE — TROCAR 5X100 BLADED Z-THREAD - KII (6/BX)

## (undated) DEVICE — SUTURE 0 VICRYL PLUS CT-2 - 27 INCH (36/BX)

## (undated) DEVICE — GLOVE BIOGEL SZ 8 SURGICAL PF LTX - (50PR/BX 4BX/CA)

## (undated) DEVICE — SUCTION INSTRUMENT YANKAUER BULBOUS TIP W/O VENT (50EA/CA)

## (undated) DEVICE — GLOVE BIOGEL PI INDICATOR SZ 7.0 SURGICAL PF LF - (50/BX 4BX/CA)

## (undated) DEVICE — SCISSORS 5MM CVD (6EA/BX)

## (undated) DEVICE — TOWEL STOP TIMEOUT SAFETY FLAG (40EA/CA)

## (undated) DEVICE — SET TUBING PNEUMOCLEAR HIGH FLOW SMOKE EVACUATION (10EA/BX)